# Patient Record
Sex: MALE | Race: WHITE | NOT HISPANIC OR LATINO | Employment: OTHER | URBAN - METROPOLITAN AREA
[De-identification: names, ages, dates, MRNs, and addresses within clinical notes are randomized per-mention and may not be internally consistent; named-entity substitution may affect disease eponyms.]

---

## 2021-03-19 ENCOUNTER — RECORDS - HEALTHEAST (OUTPATIENT)
Dept: LAB | Facility: CLINIC | Age: 46
End: 2021-03-19

## 2021-03-19 LAB
SARS-COV-2 PCR COMMENT: NORMAL
SARS-COV-2 RNA SPEC QL NAA+PROBE: NEGATIVE
SARS-COV-2 VIRUS SPECIMEN SOURCE: NORMAL

## 2023-10-27 ENCOUNTER — APPOINTMENT (OUTPATIENT)
Dept: GENERAL RADIOLOGY | Facility: CLINIC | Age: 48
End: 2023-10-27
Attending: EMERGENCY MEDICINE
Payer: MEDICARE

## 2023-10-27 ENCOUNTER — HOSPITAL ENCOUNTER (EMERGENCY)
Facility: CLINIC | Age: 48
Discharge: HOME | End: 2023-10-27
Attending: EMERGENCY MEDICINE | Admitting: EMERGENCY MEDICINE
Payer: MEDICARE

## 2023-10-27 ENCOUNTER — APPOINTMENT (OUTPATIENT)
Dept: CT IMAGING | Facility: CLINIC | Age: 48
End: 2023-10-27
Attending: EMERGENCY MEDICINE
Payer: MEDICARE

## 2023-10-27 VITALS
OXYGEN SATURATION: 96 % | DIASTOLIC BLOOD PRESSURE: 61 MMHG | SYSTOLIC BLOOD PRESSURE: 110 MMHG | HEART RATE: 90 BPM | RESPIRATION RATE: 20 BRPM | TEMPERATURE: 98 F

## 2023-10-27 DIAGNOSIS — R55 SYNCOPE, UNSPECIFIED SYNCOPE TYPE: ICD-10-CM

## 2023-10-27 LAB
ANION GAP SERPL CALCULATED.3IONS-SCNC: 12 MMOL/L (ref 7–15)
BASOPHILS # BLD AUTO: 0.1 10E3/UL (ref 0–0.2)
BASOPHILS NFR BLD AUTO: 1 %
BUN SERPL-MCNC: 16.6 MG/DL (ref 6–20)
CALCIUM SERPL-MCNC: 8.4 MG/DL (ref 8.6–10)
CHLORIDE SERPL-SCNC: 106 MMOL/L (ref 98–107)
CREAT SERPL-MCNC: 1.09 MG/DL (ref 0.67–1.17)
DEPRECATED HCO3 PLAS-SCNC: 21 MMOL/L (ref 22–29)
EGFRCR SERPLBLD CKD-EPI 2021: 83 ML/MIN/1.73M2
EOSINOPHIL # BLD AUTO: 0 10E3/UL (ref 0–0.7)
EOSINOPHIL NFR BLD AUTO: 0 %
ERYTHROCYTE [DISTWIDTH] IN BLOOD BY AUTOMATED COUNT: 13.2 % (ref 10–15)
GLUCOSE SERPL-MCNC: 102 MG/DL (ref 70–99)
HCT VFR BLD AUTO: 39.9 % (ref 40–53)
HGB BLD-MCNC: 14.1 G/DL (ref 13.3–17.7)
IMM GRANULOCYTES # BLD: 0 10E3/UL
IMM GRANULOCYTES NFR BLD: 1 %
LYMPHOCYTES # BLD AUTO: 0.5 10E3/UL (ref 0.8–5.3)
LYMPHOCYTES NFR BLD AUTO: 6 %
MCH RBC QN AUTO: 34.8 PG (ref 26.5–33)
MCHC RBC AUTO-ENTMCNC: 35.3 G/DL (ref 31.5–36.5)
MCV RBC AUTO: 99 FL (ref 78–100)
MONOCYTES # BLD AUTO: 0.7 10E3/UL (ref 0–1.3)
MONOCYTES NFR BLD AUTO: 9 %
NEUTROPHILS # BLD AUTO: 6.3 10E3/UL (ref 1.6–8.3)
NEUTROPHILS NFR BLD AUTO: 83 %
NRBC # BLD AUTO: 0 10E3/UL
NRBC BLD AUTO-RTO: 0 /100
PLATELET # BLD AUTO: 224 10E3/UL (ref 150–450)
POTASSIUM SERPL-SCNC: 4.4 MMOL/L (ref 3.4–5.3)
RBC # BLD AUTO: 4.05 10E6/UL (ref 4.4–5.9)
SODIUM SERPL-SCNC: 139 MMOL/L (ref 135–145)
WBC # BLD AUTO: 7.5 10E3/UL (ref 4–11)

## 2023-10-27 PROCEDURE — 82310 ASSAY OF CALCIUM: CPT | Performed by: EMERGENCY MEDICINE

## 2023-10-27 PROCEDURE — 99284 EMERGENCY DEPT VISIT MOD MDM: CPT | Mod: 25 | Performed by: EMERGENCY MEDICINE

## 2023-10-27 PROCEDURE — 99285 EMERGENCY DEPT VISIT HI MDM: CPT | Mod: 25 | Performed by: EMERGENCY MEDICINE

## 2023-10-27 PROCEDURE — 70450 CT HEAD/BRAIN W/O DYE: CPT | Mod: MG

## 2023-10-27 PROCEDURE — 85014 HEMATOCRIT: CPT | Performed by: EMERGENCY MEDICINE

## 2023-10-27 PROCEDURE — 71046 X-RAY EXAM CHEST 2 VIEWS: CPT

## 2023-10-27 PROCEDURE — 93010 ELECTROCARDIOGRAM REPORT: CPT | Performed by: EMERGENCY MEDICINE

## 2023-10-27 PROCEDURE — 93005 ELECTROCARDIOGRAM TRACING: CPT | Performed by: EMERGENCY MEDICINE

## 2023-10-27 PROCEDURE — 36415 COLL VENOUS BLD VENIPUNCTURE: CPT | Performed by: EMERGENCY MEDICINE

## 2023-10-27 ASSESSMENT — ACTIVITIES OF DAILY LIVING (ADL): ADLS_ACUITY_SCORE: 35

## 2023-10-27 NOTE — ED PROVIDER NOTES
History     Chief Complaint   Patient presents with    Loss of Consciousness     HPI  Levar Swan is a 48 year old male who presents with loss of consciousness.  The patient has a history of Down syndrome.  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 Felt lightheaded. Set down ice. Then lost consciousness. Denies hitting head. No shaking after. Woke up right away.     No cardiac history. No seizure history.     Allergies:  Allergies   Allergen Reactions    Penicillins Unknown       Problem List:    There are no problems to display for this patient.       Past Medical History:    No past medical history on file.    Past Surgical History:    No past surgical history on file.    Family History:    No family history on file.    Social History:  Marital Status:  Single [1]  Social History     Tobacco Use    Smoking status: Never   Substance Use Topics    Alcohol use: No    Drug use: No        Medications:    No current outpatient medications on file.        Review of Systems    Physical Exam   BP: 106/73  Pulse: 96  Temp: 98  F (36.7  C)  Resp: 13  SpO2: 95 %      Physical Exam  Constitutional:       General: He is not in acute distress.     Appearance: He is not toxic-appearing.   Cardiovascular:      Rate and Rhythm: Normal rate.      Heart sounds: No murmur heard.     Comments: No m/r/g  Pulmonary:      Effort: No respiratory distress.      Breath sounds: No wheezing.   Abdominal:      General: There is no distension.      Tenderness: There is no abdominal tenderness.   Neurological:      Mental Status: He is alert.      Comments: No facial droop  Strength is 5/5 in upper and lower extremity b/l  Reflexes normal throughout         ED Course              ED Course as of 11/06/23 0202   Fri Oct 27, 2023   1255 ECG w/o monet, std, acute tws, no brugada pattern, no hocm pattern, normal intervals, no blocks, no epsilon wave or delta wave.      1255 No wbc elevation. Not anemic.   1342 CT head  was read as negative.   1343 I independently interpreted the head CT and do not see any acute processes.   1343 I independently interpreted the chest x-ray and I did not see any acute processes.   1343 Chest x-ray was read as negative and I concur with this read.   1343 Electrolytes are generally reassuring.  Calcium is slightly low at 8.4.   1413 Spoke with the patient and his caregiver who is at bedside.  He ate lunch here.  He feels very well.  He would like to go home.  I gave him ER precautions.  Told him not totally sure causes but that his work appears reassuring.  I am going to discharge him at his request at this time.   1414 Told him that my best guess was that there was some component of stress and also some Valsalva when he was lifting a box that reduced venous return and reduced blood flow temporarily to his brain.  Is also discussed in the room that he apparently has very bad sleep apnea that he refuses to   1414  use a mouthguard before and refuses to use a machine for.  He apparently sleeps very poorly and also gets up very early for work.  This may have also played a role.  I encouraged him to drink more milk for his low calcium.     Procedures              EKG Interpretation:      Interpreted by Steve Jones MD  Time reviewed: 1255  Symptoms at time of EKG: none   Rhythm: normal sinus   Rate: normal  Axis: normal  Ectopy: none  Conduction: normal  ST Segments/ T Waves: No ST-T wave changes  Q Waves: none  Comparison to prior: No old EKG available    Clinical Impression: normal EKG        No results found for this or any previous visit (from the past 24 hour(s)).      Medications - No data to display    Assessments & Plan (with Medical Decision Making)     Appears to be syncope, somewhat reassuring history given lifting and then lightheadedness. Will get ECG, head CT out of cautions, labs. Will reassess. Exam reassuring.     I have reviewed the nursing notes.    I have reviewed the findings,  diagnosis, plan and need for follow up with the patient.        Medical Decision Making  The patient's presentation was of high complexity (an acute health issue posing potential threat to life or bodily function).    The patient's evaluation involved:  an assessment requiring an independent historian (see separate area of note for details)  ordering and/or review of 3+ test(s) in this encounter (see separate area of note for details)  independent interpretation of testing performed by another health professional (see separate area of note for details)    The patient's management necessitated high risk (a decision regarding hospitalization).        There are no discharge medications for this patient.      Final diagnoses:   Syncope, unspecified syncope type       10/27/2023   [facility] EMERGENCY DEPT       Steve Jones MD  11/06/23 0204

## 2023-10-27 NOTE — ED NOTES
Discharge instructions reviewed in detail.  Pt care giver verbalized understanding.  No further questions or concerns.

## 2023-10-27 NOTE — ED TRIAGE NOTES
Pt was cleaning when he had a syncopal episode.  Was assisted to a chair where he passed out again.  SBP initially in 80's for EMS.  20g IV placed into left hand.  500 ml normal saline infused.  Pt with hx of down syndrome, alert and oriented to self, location and why he's here. Pt states he feels dizzy.  Lives in group home.     Triage Assessment (Adult)       Row Name 10/27/23 1139          Triage Assessment    Airway WDL WDL        Respiratory WDL    Respiratory WDL WDL        Skin Circulation/Temperature WDL    Skin Circulation/Temperature WDL WDL        Cardiac WDL    Cardiac WDL WDL        Peripheral/Neurovascular WDL    Peripheral Neurovascular WDL WDL        Cognitive/Neuro/Behavioral WDL    Cognitive/Neuro/Behavioral WDL WDL;X  hx of down's syndrome     Orientation oriented x 4

## 2023-10-27 NOTE — DISCHARGE INSTRUCTIONS
You were seen today for passing out.  We call this syncope.  I am not totally sure what caused it, but things here look okay and I think it is probably okay for you to go home.  If you feel worse or this happens again, please come back.  I want you to follow-up with your regular doctor within the next 5 days.  I would also encourage you to use your mouthguard at night.  I think this will help you rest better and you will feel better at work and hopefully prevent this from happening again.    Your calcium was slightly low, so please drink more milk.  I do not think this caused you to pass out, however.    Take good care.     It was nice to meet you.

## 2023-11-17 ENCOUNTER — LAB REQUISITION (OUTPATIENT)
Dept: LAB | Facility: CLINIC | Age: 48
End: 2023-11-17
Payer: MEDICARE

## 2023-11-17 DIAGNOSIS — R41.3 OTHER AMNESIA: ICD-10-CM

## 2023-11-17 PROCEDURE — 82607 VITAMIN B-12: CPT | Mod: ORL | Performed by: FAMILY MEDICINE

## 2023-11-17 PROCEDURE — 84443 ASSAY THYROID STIM HORMONE: CPT | Mod: ORL | Performed by: FAMILY MEDICINE

## 2023-11-18 LAB
TSH SERPL DL<=0.005 MIU/L-ACNC: 2.51 UIU/ML (ref 0.3–4.2)
VIT B12 SERPL-MCNC: 331 PG/ML (ref 232–1245)

## 2024-04-19 ENCOUNTER — LAB REQUISITION (OUTPATIENT)
Dept: LAB | Facility: CLINIC | Age: 49
End: 2024-04-19
Payer: MEDICARE

## 2024-04-19 DIAGNOSIS — Z13.220 ENCOUNTER FOR SCREENING FOR LIPOID DISORDERS: ICD-10-CM

## 2024-04-19 DIAGNOSIS — Z13.228 ENCOUNTER FOR SCREENING FOR OTHER METABOLIC DISORDERS: ICD-10-CM

## 2024-04-19 PROCEDURE — 80053 COMPREHEN METABOLIC PANEL: CPT | Mod: ORL | Performed by: FAMILY MEDICINE

## 2024-04-19 PROCEDURE — 80061 LIPID PANEL: CPT | Mod: ORL | Performed by: FAMILY MEDICINE

## 2024-04-19 PROCEDURE — 82607 VITAMIN B-12: CPT | Mod: ORL | Performed by: FAMILY MEDICINE

## 2024-04-20 LAB
ALBUMIN SERPL BCG-MCNC: 3.9 G/DL (ref 3.5–5.2)
ALP SERPL-CCNC: 128 U/L (ref 40–150)
ALT SERPL W P-5'-P-CCNC: 20 U/L (ref 0–70)
ANION GAP SERPL CALCULATED.3IONS-SCNC: 11 MMOL/L (ref 7–15)
AST SERPL W P-5'-P-CCNC: 23 U/L (ref 0–45)
BILIRUB SERPL-MCNC: 0.3 MG/DL
BUN SERPL-MCNC: 15.4 MG/DL (ref 6–20)
CALCIUM SERPL-MCNC: 8.6 MG/DL (ref 8.6–10)
CHLORIDE SERPL-SCNC: 104 MMOL/L (ref 98–107)
CHOLEST SERPL-MCNC: 180 MG/DL
CREAT SERPL-MCNC: 0.96 MG/DL (ref 0.67–1.17)
DEPRECATED HCO3 PLAS-SCNC: 25 MMOL/L (ref 22–29)
EGFRCR SERPLBLD CKD-EPI 2021: >90 ML/MIN/1.73M2
FASTING STATUS PATIENT QL REPORTED: ABNORMAL
GLUCOSE SERPL-MCNC: 88 MG/DL (ref 70–99)
HDLC SERPL-MCNC: 31 MG/DL
LDLC SERPL CALC-MCNC: 110 MG/DL
NONHDLC SERPL-MCNC: 149 MG/DL
POTASSIUM SERPL-SCNC: 3.9 MMOL/L (ref 3.4–5.3)
PROT SERPL-MCNC: 7.3 G/DL (ref 6.4–8.3)
SODIUM SERPL-SCNC: 140 MMOL/L (ref 135–145)
TRIGL SERPL-MCNC: 197 MG/DL

## 2024-04-22 ENCOUNTER — LAB REQUISITION (OUTPATIENT)
Dept: LAB | Facility: CLINIC | Age: 49
End: 2024-04-22
Payer: MEDICARE

## 2024-04-22 DIAGNOSIS — Z13.228 ENCOUNTER FOR SCREENING FOR OTHER METABOLIC DISORDERS: ICD-10-CM

## 2024-04-22 LAB — VIT B12 SERPL-MCNC: 330 PG/ML (ref 232–1245)

## 2024-08-26 ENCOUNTER — APPOINTMENT (OUTPATIENT)
Dept: RADIOLOGY | Facility: HOSPITAL | Age: 49
End: 2024-08-26
Attending: EMERGENCY MEDICINE
Payer: MEDICARE

## 2024-08-26 ENCOUNTER — APPOINTMENT (OUTPATIENT)
Dept: CT IMAGING | Facility: HOSPITAL | Age: 49
End: 2024-08-26
Attending: EMERGENCY MEDICINE
Payer: MEDICARE

## 2024-08-26 ENCOUNTER — HOSPITAL ENCOUNTER (EMERGENCY)
Facility: HOSPITAL | Age: 49
Discharge: HOME | End: 2024-08-26
Attending: EMERGENCY MEDICINE | Admitting: EMERGENCY MEDICINE
Payer: MEDICARE

## 2024-08-26 VITALS
HEIGHT: 66 IN | BODY MASS INDEX: 30.91 KG/M2 | TEMPERATURE: 98.5 F | OXYGEN SATURATION: 96 % | HEART RATE: 102 BPM | DIASTOLIC BLOOD PRESSURE: 54 MMHG | SYSTOLIC BLOOD PRESSURE: 97 MMHG | WEIGHT: 192.3 LBS | RESPIRATION RATE: 25 BRPM

## 2024-08-26 DIAGNOSIS — D72.829 LEUKOCYTOSIS, UNSPECIFIED TYPE: ICD-10-CM

## 2024-08-26 DIAGNOSIS — R55 SYNCOPE, UNSPECIFIED SYNCOPE TYPE: ICD-10-CM

## 2024-08-26 LAB
ALBUMIN UR-MCNC: NEGATIVE MG/DL
ANION GAP SERPL CALCULATED.3IONS-SCNC: 8 MMOL/L (ref 7–15)
APPEARANCE UR: CLEAR
BASOPHILS # BLD AUTO: 0.1 10E3/UL (ref 0–0.2)
BASOPHILS NFR BLD AUTO: 0 %
BILIRUB UR QL STRIP: NEGATIVE
BUN SERPL-MCNC: 16.6 MG/DL (ref 6–20)
CALCIUM SERPL-MCNC: 8.4 MG/DL (ref 8.8–10.4)
CHLORIDE SERPL-SCNC: 101 MMOL/L (ref 98–107)
COLOR UR AUTO: COLORLESS
CREAT SERPL-MCNC: 1.17 MG/DL (ref 0.67–1.17)
D DIMER PPP FEU-MCNC: 0.4 UG/ML FEU (ref 0–0.5)
EGFRCR SERPLBLD CKD-EPI 2021: 76 ML/MIN/1.73M2
EOSINOPHIL # BLD AUTO: 0 10E3/UL (ref 0–0.7)
EOSINOPHIL NFR BLD AUTO: 0 %
ERYTHROCYTE [DISTWIDTH] IN BLOOD BY AUTOMATED COUNT: 13.7 % (ref 10–15)
GLUCOSE SERPL-MCNC: 114 MG/DL (ref 70–99)
GLUCOSE UR STRIP-MCNC: NEGATIVE MG/DL
HCO3 SERPL-SCNC: 28 MMOL/L (ref 22–29)
HCT VFR BLD AUTO: 40.7 % (ref 40–53)
HGB BLD-MCNC: 14.1 G/DL (ref 13.3–17.7)
HGB UR QL STRIP: NEGATIVE
IMM GRANULOCYTES # BLD: 0.1 10E3/UL
IMM GRANULOCYTES NFR BLD: 1 %
KETONES UR STRIP-MCNC: NEGATIVE MG/DL
LACTATE SERPL-SCNC: 1.1 MMOL/L (ref 0.7–2)
LEUKOCYTE ESTERASE UR QL STRIP: NEGATIVE
LYMPHOCYTES # BLD AUTO: 0.5 10E3/UL (ref 0.8–5.3)
LYMPHOCYTES NFR BLD AUTO: 3 %
MAGNESIUM SERPL-MCNC: 1.9 MG/DL (ref 1.7–2.3)
MCH RBC QN AUTO: 34 PG (ref 26.5–33)
MCHC RBC AUTO-ENTMCNC: 34.6 G/DL (ref 31.5–36.5)
MCV RBC AUTO: 98 FL (ref 78–100)
MONOCYTES # BLD AUTO: 0.7 10E3/UL (ref 0–1.3)
MONOCYTES NFR BLD AUTO: 3 %
MUCOUS THREADS #/AREA URNS LPF: PRESENT /LPF
NEUTROPHILS # BLD AUTO: 20.1 10E3/UL (ref 1.6–8.3)
NEUTROPHILS NFR BLD AUTO: 93 %
NITRATE UR QL: NEGATIVE
NRBC # BLD AUTO: 0 10E3/UL
NRBC BLD AUTO-RTO: 0 /100
NT-PROBNP SERPL-MCNC: <36 PG/ML (ref 0–900)
PH UR STRIP: 6.5 [PH] (ref 5–7)
PLATELET # BLD AUTO: 262 10E3/UL (ref 150–450)
POTASSIUM SERPL-SCNC: 4.2 MMOL/L (ref 3.4–5.3)
RBC # BLD AUTO: 4.15 10E6/UL (ref 4.4–5.9)
RBC URINE: <1 /HPF
SODIUM SERPL-SCNC: 137 MMOL/L (ref 135–145)
SP GR UR STRIP: 1 (ref 1–1.03)
TROPONIN T SERPL HS-MCNC: 10 NG/L
TROPONIN T SERPL HS-MCNC: 10 NG/L
UROBILINOGEN UR STRIP-MCNC: <2 MG/DL
WBC # BLD AUTO: 21.5 10E3/UL (ref 4–11)
WBC URINE: <1 /HPF

## 2024-08-26 PROCEDURE — 99285 EMERGENCY DEPT VISIT HI MDM: CPT | Mod: 25

## 2024-08-26 PROCEDURE — 85379 FIBRIN DEGRADATION QUANT: CPT | Performed by: EMERGENCY MEDICINE

## 2024-08-26 PROCEDURE — 80048 BASIC METABOLIC PNL TOTAL CA: CPT | Performed by: STUDENT IN AN ORGANIZED HEALTH CARE EDUCATION/TRAINING PROGRAM

## 2024-08-26 PROCEDURE — 93005 ELECTROCARDIOGRAM TRACING: CPT | Performed by: STUDENT IN AN ORGANIZED HEALTH CARE EDUCATION/TRAINING PROGRAM

## 2024-08-26 PROCEDURE — 96360 HYDRATION IV INFUSION INIT: CPT

## 2024-08-26 PROCEDURE — 71046 X-RAY EXAM CHEST 2 VIEWS: CPT

## 2024-08-26 PROCEDURE — 258N000003 HC RX IP 258 OP 636: Performed by: EMERGENCY MEDICINE

## 2024-08-26 PROCEDURE — 84484 ASSAY OF TROPONIN QUANT: CPT | Performed by: EMERGENCY MEDICINE

## 2024-08-26 PROCEDURE — 81003 URINALYSIS AUTO W/O SCOPE: CPT | Performed by: EMERGENCY MEDICINE

## 2024-08-26 PROCEDURE — 83880 ASSAY OF NATRIURETIC PEPTIDE: CPT | Performed by: EMERGENCY MEDICINE

## 2024-08-26 PROCEDURE — 84484 ASSAY OF TROPONIN QUANT: CPT | Performed by: STUDENT IN AN ORGANIZED HEALTH CARE EDUCATION/TRAINING PROGRAM

## 2024-08-26 PROCEDURE — 93005 ELECTROCARDIOGRAM TRACING: CPT | Performed by: EMERGENCY MEDICINE

## 2024-08-26 PROCEDURE — 36415 COLL VENOUS BLD VENIPUNCTURE: CPT | Performed by: EMERGENCY MEDICINE

## 2024-08-26 PROCEDURE — 83735 ASSAY OF MAGNESIUM: CPT | Performed by: STUDENT IN AN ORGANIZED HEALTH CARE EDUCATION/TRAINING PROGRAM

## 2024-08-26 PROCEDURE — 83605 ASSAY OF LACTIC ACID: CPT | Performed by: EMERGENCY MEDICINE

## 2024-08-26 PROCEDURE — 85025 COMPLETE CBC W/AUTO DIFF WBC: CPT | Performed by: STUDENT IN AN ORGANIZED HEALTH CARE EDUCATION/TRAINING PROGRAM

## 2024-08-26 PROCEDURE — 70450 CT HEAD/BRAIN W/O DYE: CPT | Mod: MG

## 2024-08-26 RX ADMIN — SODIUM CHLORIDE 1000 ML: 9 INJECTION, SOLUTION INTRAVENOUS at 14:03

## 2024-08-26 ASSESSMENT — COLUMBIA-SUICIDE SEVERITY RATING SCALE - C-SSRS
2. HAVE YOU ACTUALLY HAD ANY THOUGHTS OF KILLING YOURSELF IN THE PAST MONTH?: NO
6. HAVE YOU EVER DONE ANYTHING, STARTED TO DO ANYTHING, OR PREPARED TO DO ANYTHING TO END YOUR LIFE?: NO
1. IN THE PAST MONTH, HAVE YOU WISHED YOU WERE DEAD OR WISHED YOU COULD GO TO SLEEP AND NOT WAKE UP?: NO

## 2024-08-26 ASSESSMENT — ACTIVITIES OF DAILY LIVING (ADL)
ADLS_ACUITY_SCORE: 35
ADLS_ACUITY_SCORE: 33
ADLS_ACUITY_SCORE: 35

## 2024-08-26 ASSESSMENT — ENCOUNTER SYMPTOMS
BLOOD IN STOOL: 0
HEADACHES: 0
ABDOMINAL PAIN: 0

## 2024-08-26 NOTE — ED TRIAGE NOTES
Patient was sitting at [employer], had episode of LOC.  Pt appeared to have complete body shaking, no loss bowel/bladder control, awoke- the A&O.  Pt voices no c/o.  Pt has not seizure hx.   Has had one episode like this in past.

## 2024-08-26 NOTE — ED PROVIDER NOTES
EMERGENCY DEPARTMENT ENCOUNTER      NAME: Levar Swan  AGE: 49 year old male  YOB: 1975  MRN: [id number]  EVALUATION DATE & TIME: 8/26/2024 12:51 PM    PCP: Josi Ho    ED PROVIDER: Bairon Hinkle MD      Chief Complaint   Patient presents with    Loss of Consciousness         FINAL IMPRESSION:  1. Syncope, unspecified syncope type    2. Leukocytosis, unspecified type          ED COURSE & MEDICAL DECISION MAKING:    Pertinent Labs & Imaging studies reviewed. (See chart for details)  49 year old male presents to the Emergency Department for evaluation of loss of consciousness.      ED Course as of 08/26/24 1550   Mon Aug 26, 2024   1336 Here with syncope   1336 DDX seizure, subarachnoid hemorrhage, ruptured abdominal aortic aneurysm, aortic dissection, perforated gastric/duodenal ulcer, ruptured ectopic pregnancy, stroke/TIA, acute coronary syndrome, pulmonary embolism, arrhythmia (atrioventricular block/symptomatic bradycardia, Shantell-Parkinson-White syndrome, Brugada syndrome, hypertrophic cardiomyopathy, long QT syndrome, arrhythmogenic right ventricular dysplasia), aortic stenosis, hypoglycemia, vasovagal, or orthostatic hypotension.     1337 WBC(!): 21.5  Denies fever   1337 Will obtain UA and chest imaging.  No abdominal tenderness   1402 I spoke with his mother on the phone. He was seen by [place] [facility] 10/23 for syncope. He has fainted before per mother   1530 Troponin T, High Sensitivity: 10   1548 UA does not show UTI.  Patient signed to Dr. Kumar pending completion of CT head and chest x-ray.  If no evidence of pneumonia or hemorrhage plan for discharge home and follow-up with primary care doc       Medical Decision Making  Obtained supplemental history:Supplemental history obtained?: Documented in chart and Other: Supervisor  Reviewed external records: External records reviewed?: No  Care impacted by chronic illness:N/A  Care significantly affected by social determinants of  health:N/A  Did you consider but not order tests?: Work up considered but not performed and documented in chart, if applicable  Did you interpret images independently?: Independent interpretation of ECG and images noted in documentation, when applicable.  Consultation discussion with other provider:Did you involve another provider (consultant, , pharmacy, etc.)?: I discussed the care with another health care provider, see documentation for details.  Admission considered. Patient was signed out to the oncoming physician, disposition pending.  CT Pulmonary Angiogram for PE or Dissection:The patient was low risk for pulmonary embolism and had a normal d-dimer, this a CT PE study was NOT ordered.        @eccmips@    At the conclusion of the encounter I discussed the results of all of the tests and the disposition. The questions were answered. The patient or family acknowledged understanding and was agreeable with the care plan.       MEDICATIONS GIVEN IN THE EMERGENCY:  Medications   sodium chloride 0.9% BOLUS 1,000 mL (0 mLs Intravenous Stopped 8/26/24 1450)       NEW PRESCRIPTIONS STARTED AT TODAY'S ER VISIT  New Prescriptions    No medications on file          =================================================================    HPI    Patient information was obtained from: patient    Use of : N/A       Levar Swan is a 49 year old male with no pertinent history who presents to this ED by private car for evaluation of loss of consciousness.    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 Patient then shook for 15 seconds. She mentioned that his mother mentioned that this occurred previously.    Patient denies headache, chest pain, abdominal pain, recent injuries, recent head trauma, past heart murmurs, melena, and hematochezia. Patient felt fine yesterday.    REVIEW OF SYSTEMS   Review of Systems   Cardiovascular:  Negative for chest pain.  "  Gastrointestinal:  Negative for abdominal pain and blood in stool.   Neurological:  Negative for headaches.       PAST MEDICAL HISTORY:  No past medical history on file.    PAST SURGICAL HISTORY:  No past surgical history on file.        CURRENT MEDICATIONS:    No current outpatient medications on file.      ALLERGIES:  Allergies   Allergen Reactions    Penicillins Unknown       FAMILY HISTORY:  No family history on file.    SOCIAL HISTORY:   Social History     Socioeconomic History    Marital status: Single   Tobacco Use    Smoking status: Never   Substance and Sexual Activity    Alcohol use: No    Drug use: No       VITALS:  BP (!) 149/87   Pulse 94   Temp 98.5  F (36.9  C) (Temporal)   Resp 22   Ht 1.664 m (5' 5.5\")   Wt 87.2 kg (192 lb 4.8 oz)   SpO2 95%   BMI 31.51 kg/m      PHYSICAL EXAM      Vitals: BP (!) 149/87   Pulse 94   Temp 98.5  F (36.9  C) (Temporal)   Resp 22   Ht 1.664 m (5' 5.5\")   Wt 87.2 kg (192 lb 4.8 oz)   SpO2 95%   BMI 31.51 kg/m    General: Appears in no acute distress, awake, alert, interactive.  Eyes: Conjunctivae non-injected. Sclera anicteric.  HENT: Atraumatic.  Neck: Supple.  Respiratory/Chest: Respiration unlabored.no wheezing. No rhonchi.   Heart; no murmurs  Abdomen: non distended, no abdominal tenderness  Musculoskeletal: Normal extremities. No edema or erythema.  Skin: Normal color. No rash or diaphoresis.  Neurologic: Face symmetric, moves all extremities spontaneously. Speech clear. GCS 15. 5/5 strength arms and legs  Psychiatric: Oriented to person, place, and time. Affect appropriate.    LAB:  All pertinent labs reviewed and interpreted.  Results for orders placed or performed during the hospital encounter of 08/26/24   Basic metabolic panel   Result Value Ref Range    Sodium 137 135 - 145 mmol/L    Potassium 4.2 3.4 - 5.3 mmol/L    Chloride 101 98 - 107 mmol/L    Carbon Dioxide (CO2) 28 22 - 29 mmol/L    Anion Gap 8 7 - 15 mmol/L    Urea Nitrogen 16.6 6.0 - " 20.0 mg/dL    Creatinine 1.17 0.67 - 1.17 mg/dL    GFR Estimate 76 >60 mL/min/1.73m2    Calcium 8.4 (L) 8.8 - 10.4 mg/dL    Glucose 114 (H) 70 - 99 mg/dL   Result Value Ref Range    Magnesium 1.9 1.7 - 2.3 mg/dL   Result Value Ref Range    Troponin T, High Sensitivity 10 <=22 ng/L   CBC with platelets and differential   Result Value Ref Range    WBC Count 21.5 (H) 4.0 - 11.0 10e3/uL    RBC Count 4.15 (L) 4.40 - 5.90 10e6/uL    Hemoglobin 14.1 13.3 - 17.7 g/dL    Hematocrit 40.7 40.0 - 53.0 %    MCV 98 78 - 100 fL    MCH 34.0 (H) 26.5 - 33.0 pg    MCHC 34.6 31.5 - 36.5 g/dL    RDW 13.7 10.0 - 15.0 %    Platelet Count 262 150 - 450 10e3/uL    % Neutrophils 93 %    % Lymphocytes 3 %    % Monocytes 3 %    % Eosinophils 0 %    % Basophils 0 %    % Immature Granulocytes 1 %    NRBCs per 100 WBC 0 <1 /100    Absolute Neutrophils 20.1 (H) 1.6 - 8.3 10e3/uL    Absolute Lymphocytes 0.5 (L) 0.8 - 5.3 10e3/uL    Absolute Monocytes 0.7 0.0 - 1.3 10e3/uL    Absolute Eosinophils 0.0 0.0 - 0.7 10e3/uL    Absolute Basophils 0.1 0.0 - 0.2 10e3/uL    Absolute Immature Granulocytes 0.1 <=0.4 10e3/uL    Absolute NRBCs 0.0 10e3/uL   D dimer quantitative   Result Value Ref Range    D-Dimer Quantitative 0.40 0.00 - 0.50 ug/mL FEU   Lactic acid whole blood   Result Value Ref Range    Lactic Acid 1.1 0.7 - 2.0 mmol/L   UA with Microscopic reflex to Culture    Specimen: Urine, Midstream   Result Value Ref Range    Color Urine Colorless Colorless, Straw, Light Yellow, Yellow    Appearance Urine Clear Clear    Glucose Urine Negative Negative mg/dL    Bilirubin Urine Negative Negative    Ketones Urine Negative Negative mg/dL    Specific Gravity Urine 1.004 1.001 - 1.030    Blood Urine Negative Negative    pH Urine 6.5 5.0 - 7.0    Protein Albumin Urine Negative Negative mg/dL    Urobilinogen Urine <2.0 <2.0 mg/dL    Nitrite Urine Negative Negative    Leukocyte Esterase Urine Negative Negative    Mucus Urine Present (A) None Seen /LPF    RBC  Urine <1 <=2 /HPF    WBC Urine <1 <=5 /HPF   Troponin T, High Sensitivity (now)   Result Value Ref Range    Troponin T, High Sensitivity 10 <=22 ng/L       RADIOLOGY:  Reviewed all pertinent imaging. Please see official radiology report.  Chest XR,  PA & LAT    (Results Pending)   Head CT w/o contrast    (Results Pending)       EKG:    Performed at: 12:43:07    Impression: Normal ECG    Rate: 90 bpm  Rhythm: Sinus  Axis: n/a  VA Interval: 130 ms  QRS Interval: 86 ms  QTc Interval: 428 ms  ST Changes: n/a  Comparison: When compared with ECG of 02/08/2012 at 15:06, no significant change was found.    I have independently reviewed and interpreted the EKG(s) documented above.    PROCEDURES:           I, Amanda Oates, am serving as a scribe to document services personally performed by Bairon Hinkle MD based on my observation and the provider's statements to me. I, Bairon Hinkle MD, attest that Amanda Oates is acting in a scribe capacity, has observed my performance of the services and has documented them in accordance with my direction.    Bairon Hinkle MD  [facility] EMERGENCY DEPARTMENT  [facility] [facility] [address]  [phone]        Bairon Hinkle MD  08/26/24 8690

## 2024-08-26 NOTE — ED PROVIDER NOTES
EMERGENCY DEPARTMENT SIGN OUT NOTE        ED COURSE AND MEDICAL DECISION MAKING  Patient was signed out to me by Dr Mesfin Hinkle at 4:05 PM    In brief, Levar Swan is a 49 year old male who initially presented likely syncopal episode.    At time of sign out, disposition was pending CT head and chest x-ray.   4:29 PM.  Chest x-ray with evidence of cephalization of vessels suggesting pulmonary edema.  5:29 PM.  CT images reviewed.  Patient with unusual calcifications of the anterior horns.  No acute hemorrhage.  Awaiting definitive report.  PE returns normal at 36.  6:04 PM.  Radiology reports CT unchanged.  Will proceed with discharge as planned.      FINAL IMPRESSION    1. Syncope, unspecified syncope type    2. Leukocytosis, unspecified type        ED MEDS  Medications   sodium chloride 0.9% BOLUS 1,000 mL (0 mLs Intravenous Stopped 8/26/24 1450)       LAB  Results for orders placed or performed during the hospital encounter of 08/26/24   Chest XR,  PA & LAT     Status: None    Narrative    EXAM: XR CHEST 2 VIEWS  LOCATION: [facility]  DATE: 8/26/2024    INDICATION: syncope  COMPARISON: None.      Impression    IMPRESSION: Heart size appears normal. Cephalization of blood flow to suggest pulmonary vascular congestion. Benign no pleural effusion. Mild patchy lung base opacities may represent prominent pulmonary vessels, and/or asymmetric edema.   Head CT w/o contrast     Status: None    Narrative    EXAM: CT HEAD W/O CONTRAST  LOCATION: [facility]  DATE: 8/26/2024    INDICATION: syncope  COMPARISON: 10/27/2023 head CT.  TECHNIQUE: Routine CT Head without IV contrast. Multiplanar reformats. Dose reduction techniques were used.    FINDINGS:  INTRACRANIAL CONTENTS: No intracranial hemorrhage, extraaxial collection, or mass effect.  No CT evidence of acute infarct. Redemonstrated parenchymal calcification involving the globus pallidus bilaterally as well as the  periventricular white matter   adjacent to the frontal horns bilaterally, more so on the right. Additional tiny focus of calcification in the subcortical right frontoparietal white matter and in the medial right cerebellar hemisphere. These are unchanged. Normal ventricles and sulci.     VISUALIZED ORBITS/SINUSES/MASTOIDS: No intraorbital abnormality. No paranasal sinus mucosal disease. No middle ear or mastoid effusion.    BONES/SOFT TISSUES: No skull fracture. No scalp hematoma.      Impression    IMPRESSION:  1.  Unchanged exam with no acute intracranial abnormality.    2.  Redemonstrated parenchymal calcification as described above. As noted previously, this may simply reflect dystrophic calcification. While more extensive basal ganglia calcification would typically be expected in the setting of fahr syndrome, this   should nonetheless be considered and correlation with abnormalities of calcium metabolism is advised.   Basic metabolic panel     Status: Abnormal   Result Value Ref Range    Sodium 137 135 - 145 mmol/L    Potassium 4.2 3.4 - 5.3 mmol/L    Chloride 101 98 - 107 mmol/L    Carbon Dioxide (CO2) 28 22 - 29 mmol/L    Anion Gap 8 7 - 15 mmol/L    Urea Nitrogen 16.6 6.0 - 20.0 mg/dL    Creatinine 1.17 0.67 - 1.17 mg/dL    GFR Estimate 76 >60 mL/min/1.73m2    Calcium 8.4 (L) 8.8 - 10.4 mg/dL    Glucose 114 (H) 70 - 99 mg/dL   Magnesium     Status: Normal   Result Value Ref Range    Magnesium 1.9 1.7 - 2.3 mg/dL   Troponin T, High Sensitivity     Status: Normal   Result Value Ref Range    Troponin T, High Sensitivity 10 <=22 ng/L   CBC with platelets and differential     Status: Abnormal   Result Value Ref Range    WBC Count 21.5 (H) 4.0 - 11.0 10e3/uL    RBC Count 4.15 (L) 4.40 - 5.90 10e6/uL    Hemoglobin 14.1 13.3 - 17.7 g/dL    Hematocrit 40.7 40.0 - 53.0 %    MCV 98 78 - 100 fL    MCH 34.0 (H) 26.5 - 33.0 pg    MCHC 34.6 31.5 - 36.5 g/dL    RDW 13.7 10.0 - 15.0 %    Platelet Count 262 150 - 450 10e3/uL     % Neutrophils 93 %    % Lymphocytes 3 %    % Monocytes 3 %    % Eosinophils 0 %    % Basophils 0 %    % Immature Granulocytes 1 %    NRBCs per 100 WBC 0 <1 /100    Absolute Neutrophils 20.1 (H) 1.6 - 8.3 10e3/uL    Absolute Lymphocytes 0.5 (L) 0.8 - 5.3 10e3/uL    Absolute Monocytes 0.7 0.0 - 1.3 10e3/uL    Absolute Eosinophils 0.0 0.0 - 0.7 10e3/uL    Absolute Basophils 0.1 0.0 - 0.2 10e3/uL    Absolute Immature Granulocytes 0.1 <=0.4 10e3/uL    Absolute NRBCs 0.0 10e3/uL   D dimer quantitative     Status: Normal   Result Value Ref Range    D-Dimer Quantitative 0.40 0.00 - 0.50 ug/mL FEU    Narrative    This D-dimer assay is intended for use in conjunction with a clinical pretest probability assessment model to exclude pulmonary embolism (PE) and deep venous thrombosis (DVT) in outpatients suspected of PE or DVT. The cut-off value is 0.50 ug/mL FEU.    For patients 50 years of age or older, the application of age-adjusted cut-off values for D-Dimer may increase the specificity without significant effect on sensitivity. The literature suggested calculation age adjusted cut-off in ug/L = age in years x 10 ug/L. The results in this laboratory are reported as ug/mL rather than ug/L. The calculation for age adjusted cut off in ug/mL= age in years x 0.01 ug/mL. For example, the cut off for a 76 year old male is 75 x 0.01 ug/mL = 0.76 ug/mL (760 ug/L).    M Laura et al. Age adjusted D-dimer cut-off levels to rule out pulmonary embolism: The ADJUST-PE Study. MARILEE 2014;311:6618-3120.; HJ Angel et al. Diagnostic accuracy of conventional or age adjusted D-dimer cutoff values in older patients with suspected venous thromboembolism. Systemic review and meta-analysis. BMJ 2013:346:f2492.   Lactic acid whole blood     Status: Normal   Result Value Ref Range    Lactic Acid 1.1 0.7 - 2.0 mmol/L   UA with Microscopic reflex to Culture     Status: Abnormal    Specimen: Urine, Midstream   Result Value Ref Range    Color Urine  Colorless Colorless, Straw, Light Yellow, Yellow    Appearance Urine Clear Clear    Glucose Urine Negative Negative mg/dL    Bilirubin Urine Negative Negative    Ketones Urine Negative Negative mg/dL    Specific Gravity Urine 1.004 1.001 - 1.030    Blood Urine Negative Negative    pH Urine 6.5 5.0 - 7.0    Protein Albumin Urine Negative Negative mg/dL    Urobilinogen Urine <2.0 <2.0 mg/dL    Nitrite Urine Negative Negative    Leukocyte Esterase Urine Negative Negative    Mucus Urine Present (A) None Seen /LPF    RBC Urine <1 <=2 /HPF    WBC Urine <1 <=5 /HPF    Narrative    Urine Culture not indicated   Troponin T, High Sensitivity (now)     Status: Normal   Result Value Ref Range    Troponin T, High Sensitivity 10 <=22 ng/L   N terminal pro BNP outpatient     Status: Normal   Result Value Ref Range    N Terminal Pro BNP Outpatient <36 0 - 900 pg/mL   CBC with platelets differential     Status: Abnormal    Narrative    The following orders were created for panel order CBC with platelets differential.  Procedure                               Abnormality         Status                     ---------                               -----------         ------                     CBC with platelets and d...[[id number]]  Abnormal            Final result                 Please view results for these tests on the individual orders.        RADIOLOGY    Chest XR,  PA & LAT   Final Result   IMPRESSION: Heart size appears normal. Cephalization of blood flow to suggest pulmonary vascular congestion. Benign no pleural effusion. Mild patchy lung base opacities may represent prominent pulmonary vessels, and/or asymmetric edema.      Head CT w/o contrast    (Results Pending)       DISCHARGE MEDS  New Prescriptions    No medications on file         Gil Kumar MD   Emergency Medicine  [facility] EMERGENCY DEPARTMENT  [address] [place] [id number] [phone]     Gil Kumar MD  08/26/24  [id number]

## 2024-08-26 NOTE — DISCHARGE INSTRUCTIONS
Your blood cell count was elevated.  This could be a stress response from possibly passing out.  We are not seeing signs of infection today.  Return to the ER for development of fever or if you pass out again.  Recommend recheck with primary care doctor for consideration of echocardiogram or cardiac monitoring

## 2024-09-01 LAB
ATRIAL RATE - MUSE: 90 BPM
DIASTOLIC BLOOD PRESSURE - MUSE: NORMAL MMHG
INTERPRETATION ECG - MUSE: NORMAL
P AXIS - MUSE: 41 DEGREES
PR INTERVAL - MUSE: 130 MS
QRS DURATION - MUSE: 86 MS
QT - MUSE: 350 MS
QTC - MUSE: 428 MS
R AXIS - MUSE: 13 DEGREES
SYSTOLIC BLOOD PRESSURE - MUSE: NORMAL MMHG
T AXIS - MUSE: 34 DEGREES
VENTRICULAR RATE- MUSE: 90 BPM

## 2025-03-03 ENCOUNTER — TRANSFERRED RECORDS (OUTPATIENT)
Dept: HEALTH INFORMATION MANAGEMENT | Facility: CLINIC | Age: 50
End: 2025-03-03

## 2025-03-03 ENCOUNTER — HOSPITAL ENCOUNTER (EMERGENCY)
Facility: CLINIC | Age: 50
Discharge: HOME | End: 2025-03-03
Attending: EMERGENCY MEDICINE | Admitting: EMERGENCY MEDICINE
Payer: MEDICARE

## 2025-03-03 ENCOUNTER — TELEPHONE (OUTPATIENT)
Dept: OPHTHALMOLOGY | Facility: CLINIC | Age: 50
End: 2025-03-03
Payer: MEDICARE

## 2025-03-03 VITALS
WEIGHT: 185 LBS | TEMPERATURE: 98.3 F | DIASTOLIC BLOOD PRESSURE: 77 MMHG | HEIGHT: 67 IN | BODY MASS INDEX: 29.03 KG/M2 | HEART RATE: 82 BPM | RESPIRATION RATE: 17 BRPM | SYSTOLIC BLOOD PRESSURE: 117 MMHG | OXYGEN SATURATION: 97 %

## 2025-03-03 DIAGNOSIS — H16.002 CORNEAL ULCER OF LEFT EYE: ICD-10-CM

## 2025-03-03 LAB
KOH PREPARATION: NORMAL
KOH PREPARATION: NORMAL

## 2025-03-03 PROCEDURE — 87070 CULTURE OTHR SPECIMN AEROBIC: CPT

## 2025-03-03 PROCEDURE — 87075 CULTR BACTERIA EXCEPT BLOOD: CPT

## 2025-03-03 PROCEDURE — 87102 FUNGUS ISOLATION CULTURE: CPT

## 2025-03-03 PROCEDURE — 99284 EMERGENCY DEPT VISIT MOD MDM: CPT | Performed by: EMERGENCY MEDICINE

## 2025-03-03 PROCEDURE — 87210 SMEAR WET MOUNT SALINE/INK: CPT

## 2025-03-03 PROCEDURE — 99283 EMERGENCY DEPT VISIT LOW MDM: CPT | Performed by: EMERGENCY MEDICINE

## 2025-03-03 RX ORDER — MOXIFLOXACIN 5 MG/ML
SOLUTION/ DROPS OPHTHALMIC
COMMUNITY
Start: 2025-02-28 | End: 2025-03-03

## 2025-03-03 RX ORDER — TOBRAMYCIN 3 MG/ML
SOLUTION/ DROPS OPHTHALMIC
COMMUNITY
Start: 2025-02-28 | End: 2025-03-03

## 2025-03-03 RX ORDER — NEOMYCIN SULFATE, POLYMYXIN B SULFATE AND DEXAMETHASONE 3.5; 10000; 1 MG/ML; [USP'U]/ML; MG/ML
SUSPENSION/ DROPS OPHTHALMIC
COMMUNITY
Start: 2024-06-10

## 2025-03-03 ASSESSMENT — COLUMBIA-SUICIDE SEVERITY RATING SCALE - C-SSRS
6. HAVE YOU EVER DONE ANYTHING, STARTED TO DO ANYTHING, OR PREPARED TO DO ANYTHING TO END YOUR LIFE?: NO
1. IN THE PAST MONTH, HAVE YOU WISHED YOU WERE DEAD OR WISHED YOU COULD GO TO SLEEP AND NOT WAKE UP?: NO
2. HAVE YOU ACTUALLY HAD ANY THOUGHTS OF KILLING YOURSELF IN THE PAST MONTH?: NO

## 2025-03-03 ASSESSMENT — ACTIVITIES OF DAILY LIVING (ADL)
ADLS_ACUITY_SCORE: 41
ADLS_ACUITY_SCORE: 41

## 2025-03-03 NOTE — TELEPHONE ENCOUNTER
[phone]     Called pt's caregiver, Jessika, who stated she was told to take Levar to the ER and they are there now.     Jessika declines an appt at this time.     Tamara Perez RN on 3/3/2025 at 1:53 PM

## 2025-03-03 NOTE — CONSULTS
OPHTHALMOLOGY CONSULT NOTE  03/03/25    Patient: Levar Swan      ASSESSMENT/PLAN:     Corneal ulcer, left eye  Patient with history of ABMD and keratoconus both eyes with a suppurative 2.5 x 2.5 mm inferior corneal ulcer in the left eye along with small 1 x 1 mm corneal ulcer at 1:00 that was nonstaining.  Patient was Karyn negative. No clear view to back given corneal haze. History no contact use or swimming - though on Friday his caretaker saw him rubbing his eyes very aggressively. Patient denies anything getting in the eyes. Patient was on moxifloxacin every hour and tobramycin (not fortified) since 2/28/2025 when his symptoms all of a sudden started.  Differential at this time includes bacterial ulcer though acute rapidly progressing onset and not improvement with antibiotics which should keep the differential broad including PUK.  PLAN:  -Cornea culture performed at bedside; aerobic, anaerobic, fungal, KOH prep  -Start vancomycin fortified eyedrops every hour around-the-clock in the left eye  -Start tobramycin fortified eyedrops every hour around-the-clock in the left eye  -Return to cornea clinic on 3/5/2025, patient given instructions and time    It is our pleasure to participate in this patient's care and treatment. Please contact us with any further questions or concerns.    Discussed with Dr. Norman Da Silva and Dr. Olvera who agreed with this assessment and plan.     Ophthalmology will sign-off. Please contact ophthalmologist on call with any questions or concerns. We appreciate your care of this patient.    Thank you for entrusting us with your care  Refugio Rodriguez MD, PGY2  Ophthalmology Resident  [facility]    HISTORY OF PRESENTING ILLNESS:     Levar Swan is a 49 year old male history of keratoconus and trisomy 21 who presented on 3/3/2025 with concern for not improving corneal ulcer in the left eye.  Patient was told to come to the [place] from [facility].   Since Friday on 2/28/2025 patient has been having left eye pain and discharge.  Patient was evaluated to clinic at that time he was told he has a corneal ulcer that started on tobramycin (not fortified) and moxifloxacin every hour.  Patient says the ulcer happened all of a sudden and nothing hit it and nothing got into his eye.  He does not wear any contacts.  Nor does any swimming.  He currently lives with a group home and has a watcher.    Says that the ulcer has not gotten any better and is only gotten worse, it started all of a sudden.  When he followed up at [place] [facility] on 3/3/2025 he said it is only gotten worse even on the antibiotic regimen.    10+ review of systems were otherwise negative except for that which has been stated above.      OCULAR/MEDICAL/SURGICAL HISTORIES:     Past Ocular History:   Last eye exam: 3/3/2025  Previously diagnosed ocular conditions: Keratoconus both eyes; ABMD, both eyes  Prior eye surgery/laser: None  Contact lens wear: No  Glasses: Yes  Eyedrops: Moxifloxacin every hour, tobramycin every hour    Pertinent Systemic Medications:   None pertinent    Past Medical History:  See HPI pending cornea cultures    Past Surgical History:   History reviewed. No pertinent surgical history.    Family History:  No history of macular degeneration or glaucoma  No family history of ocular blindness  No family history of rheumatological diseases    Social History:  No tobacco use in prior documentation    EXAMINATION:     Base Eye Exam       Visual Acuity (Snellen - Linear)         Right Left    Dist cc 20/200 HM              Tonometry (Tonopen, 4:13 PM)         Right Left    Pressure 16 Deferred              Pupils         Dark Light Shape React    Right 3 2 Round Brisk    Left Unable      Patient squeezing, unable to check left pupil             Visual Fields    Patient having difficulty understanding exam             Extraocular Movement         Right Left     Full, Ortho Full,  Ortho                  Slit Lamp and Fundus Exam       External Exam         Right Left    External Normal Normal              Slit Lamp Exam         Right Left    Lids/Lashes Normal Lid edema    Conjunctiva/Sclera trace injection 3+ hyperemia, mucoid discharge, chemosis primarily temporal    Cornea 360 KNV, no opacity or ulceration See picture; inferior 6 o'clock 2.5 x 2.5 mm suppurative ulceration with overlying mucous, from 6 to 1 o'clock  on the periphery there is epithelial sloughing. At 1 o'clock there is a 1x1 mm non-staining infiltrate. Diffuse d-folds    Anterior Chamber Deep and quiet Deep, though no clear view to assess cells, no obious hypopyon apparent    Iris Round and reactive Round and reactive    Lens Clear No view    Anterior Vitreous Normal No view                    Labs/Studies/Imaging Performed  Pending cornea cultures     Refugio Rodriguez MD  Resident Physician, PGY2  Department of Ophthalmology  03/03/25 4:17 PM

## 2025-03-03 NOTE — DISCHARGE INSTRUCTIONS
- Clinic is located at the following address:     [facility], 9th Floor  [address] [place] [address] [phone]     Your appointment is at 8:45 AM on Wednesday 3/5/2025.

## 2025-03-03 NOTE — ED PROVIDER NOTES
"    [facility] EMERGENCY DEPARTMENT ([facility])    3/03/25      ED PROVIDER NOTE   ED 2012:13 PM   History     Chief Complaint   Patient presents with    Eye Problem     The history is provided by the patient and medical records.     Levar Swan is a 49 year old male with history of Down syndrome who presents to the emergency department from [facility] further evaluation of a persistent corneal ulcer in his left eye.  He has been taking antibiotics x 3 days as prescribed but his corneal ulcer is getting worse. Patient was instructed to come to the ED for further evaluation.  He wears glasses, does not wear contact lenses.     Past Medical History  History reviewed. No pertinent past medical history.  History reviewed. No pertinent surgical history.  moxifloxacin (VIGAMOX) 0.5 % ophthalmic solution  neomycin-polymyxin-dexAMETHasone (MAXITROL) 3.5-56463-6.1 SUSP ophthalmic susp  tobramycin (TOBREX) 0.3 % ophthalmic solution      Allergies   Allergen Reactions    Penicillins Unknown     Family History  No family history on file.  Social History   Social History     Tobacco Use    Smoking status: Never   Substance Use Topics    Alcohol use: No    Drug use: No      A medically appropriate review of systems was performed with pertinent positives and negatives noted in the HPI, and all other systems negative.    Physical Exam   BP: 131/85  Pulse: 78  Temp: 98.3  F (36.8  C)  Resp: 18  Height: 170.2 cm (5' 7\")  Weight: 83.9 kg (185 lb)  SpO2: 98 %    Physical Exam  Physical Exam   Constitutional: oriented to person, place, and time. appears well-developed and well-nourished.   HENT:   Head: Normocephalic and atraumatic.  Eye exam deferred as ophthalmology is here seeing the patient  Neck: Normal range of motion.   Pulmonary/Chest: Effort normal. No respiratory distress.   Cardiac: No murmurs, rubs, gallops. RRR.  Abdominal: Abdomen soft, nontender, nondistended. No rebound tenderness.  MSK: Long bones " without deformity or evidence of trauma  Neurological: alert and oriented to person, place, and time.   Skin: Skin is warm and dry.   Psychiatric:  normal mood and affect.  behavior is normal. Thought content normal.      ED Course, Procedures, & Data      Procedures       A consult was attained from the ophthalmology service. The case was discussed with Ophthalmology resident Dr. Refugio Rodriguez from that service. The consulting service's recommendations were provided at 2:27 PM.     No results found for any visits on 03/03/25.  Medications - No data to display  Labs Ordered and Resulted from Time of ED Arrival to Time of ED Departure - No data to display  No orders to display          Critical care was not performed.     Medical Decision Making  The patient's presentation was of high complexity (an acute health issue posing potential threat to life or bodily function).    The patient's evaluation involved:  review of external note(s) from 1 sources (note from optometry clinic today)  discussion of management or test interpretation with another health professional (optometrist, ophthalmologist)    The patient's management necessitated only low risk treatment.    Assessment & Plan    Levar Swan is a 49 year old male who presents with worsening corneal ulcer in the left eye.  Patient seen by ophthalmology.  Please see their separate note.  They will follow him up in clinic.  They did adjust the medications and put in orders for him.  They know they can return if they have any further concerns.    I have reviewed the nursing notes. I have reviewed the findings, diagnosis, plan and need for follow up with the patient.    New Prescriptions    No medications on file       Final diagnoses:   Corneal ulcer of left eye     I, Nicole Beverly, am serving as a trained medical scribe to document services personally performed by Desmond Saeed MD based on the provider's statements to me on March 3, 2025.  This document has  been checked and approved by the attending provider.    I, Desmond Saeed MD, was physically present and have reviewed and verified the accuracy of this note documented by Nicole Beverly, medical scribe.      Desmond Saeed MD   [facility] EMERGENCY DEPARTMENT  3/3/2025        Desmond Saeed MD  03/03/25 3230

## 2025-03-03 NOTE — TELEPHONE ENCOUNTER
[facility] Call Center    Phone Message    May a detailed message be left on voicemail: yes     Reason for Call: Appointment Intake    Referring Provider Name: [facility], Referral on the way  Diagnosis and/or Symptoms: Cornea ulcer.  Please call Mari, she states pt has Down's. She states they would like the patient seen today if possible.    Action Taken: Message routed to:  [facility] Ophthalmology    Travel Screening: Not Applicable     Date of Service:

## 2025-03-03 NOTE — TELEPHONE ENCOUNTER
Mari from Dr. Anibal Ybarra [facility] is calling back, stating Jessika the patient's caregiver can be called to schedule the appointment, and her number is [phone].  Thank you.

## 2025-03-03 NOTE — ED TRIAGE NOTES
Patient from [facility]. Has corneal ulcer that is not getting better. Using medications as prescribed. Reports they tried to send him to the  eye clinic but they refused to see him so they were told to come to the ED.      Triage Assessment (Adult)       Row Name 03/03/25 1206          Triage Assessment    Airway WDL WDL        Respiratory WDL    Respiratory WDL WDL        Skin Circulation/Temperature WDL    Skin Circulation/Temperature WDL WDL        Cardiac WDL    Cardiac WDL WDL        Peripheral/Neurovascular WDL    Peripheral Neurovascular WDL WDL        Cognitive/Neuro/Behavioral WDL    Cognitive/Neuro/Behavioral WDL WDL

## 2025-03-05 NOTE — TELEPHONE ENCOUNTER
[facility] Call Center    Phone Message    May a detailed message be left on voicemail: yes     Reason for Call: Other: Jessika from group home called to reschedule apt today with Dr Olvera due to weather conditions they're not able to leave their driveway. Next available not until April and patient need to be seen sooner.     Please call Yosef back at [phone]. Thank you.    Action Taken: Message routed to:  [facility] Eye    Travel Screening: Not Applicable

## 2025-03-05 NOTE — TELEPHONE ENCOUNTER
Spoke to Jessika.  Dr. Olvera had cancellation for Friday March 7th at 845am.  They will make it work.  Aware of date/time/location.     Erin Luque on 3/5/2025 at 7:28 AM

## 2025-03-06 LAB
BACTERIA TISS BX CULT: NO GROWTH
BACTERIA TISS BX CULT: NORMAL
BACTERIA TISS BX CULT: NORMAL
GRAM STAIN RESULT: NORMAL
GRAM STAIN RESULT: NORMAL

## 2025-03-07 ENCOUNTER — ANESTHESIA (OUTPATIENT)
Dept: SURGERY | Facility: CLINIC | Age: 50
End: 2025-03-07
Payer: MEDICARE

## 2025-03-07 ENCOUNTER — HOSPITAL ENCOUNTER (OUTPATIENT)
Facility: CLINIC | Age: 50
Discharge: NURSING, REHAB OR LONG-TERM CARE | End: 2025-03-07
Attending: OPHTHALMOLOGY | Admitting: OPHTHALMOLOGY
Payer: MEDICARE

## 2025-03-07 ENCOUNTER — ANESTHESIA EVENT (OUTPATIENT)
Dept: SURGERY | Facility: CLINIC | Age: 50
End: 2025-03-07
Payer: MEDICARE

## 2025-03-07 ENCOUNTER — OFFICE VISIT (OUTPATIENT)
Dept: OPHTHALMOLOGY | Facility: CLINIC | Age: 50
End: 2025-03-07
Attending: OPHTHALMOLOGY
Payer: MEDICARE

## 2025-03-07 VITALS
BODY MASS INDEX: 31.28 KG/M2 | HEART RATE: 112 BPM | OXYGEN SATURATION: 94 % | WEIGHT: 183.2 LBS | DIASTOLIC BLOOD PRESSURE: 69 MMHG | HEIGHT: 64 IN | TEMPERATURE: 99.7 F | RESPIRATION RATE: 18 BRPM | SYSTOLIC BLOOD PRESSURE: 126 MMHG

## 2025-03-07 DIAGNOSIS — H16.072 PERFORATED CORNEAL ULCER OF LEFT EYE: Primary | ICD-10-CM

## 2025-03-07 DIAGNOSIS — H16.072 CORNEAL PERFORATION OF LEFT EYE: ICD-10-CM

## 2025-03-07 PROCEDURE — 250N000011 HC RX IP 250 OP 636

## 2025-03-07 PROCEDURE — 370N000017 HC ANESTHESIA TECHNICAL FEE, PER MIN: Performed by: OPHTHALMOLOGY

## 2025-03-07 PROCEDURE — 65730 CORNEAL TRANSPLANT: CPT | Mod: LT | Performed by: OPHTHALMOLOGY

## 2025-03-07 PROCEDURE — 76512 OPH US DX B-SCAN: CPT | Performed by: OPHTHALMOLOGY

## 2025-03-07 PROCEDURE — 87102 FUNGUS ISOLATION CULTURE: CPT | Performed by: OPHTHALMOLOGY

## 2025-03-07 PROCEDURE — 250N000009 HC RX 250

## 2025-03-07 PROCEDURE — 258N000003 HC RX IP 258 OP 636: Performed by: OPHTHALMOLOGY

## 2025-03-07 PROCEDURE — 710N000012 HC RECOVERY PHASE 2, PER MINUTE: Performed by: OPHTHALMOLOGY

## 2025-03-07 PROCEDURE — 710N000011 HC RECOVERY PHASE 1, LEVEL 3, PER MIN: Performed by: OPHTHALMOLOGY

## 2025-03-07 PROCEDURE — 272N000001 HC OR GENERAL SUPPLY STERILE: Performed by: OPHTHALMOLOGY

## 2025-03-07 PROCEDURE — 250N000011 HC RX IP 250 OP 636: Performed by: OPHTHALMOLOGY

## 2025-03-07 PROCEDURE — 999N000141 HC STATISTIC PRE-PROCEDURE NURSING ASSESSMENT: Performed by: OPHTHALMOLOGY

## 2025-03-07 PROCEDURE — 99205 OFFICE O/P NEW HI 60 MIN: CPT | Mod: 57 | Performed by: OPHTHALMOLOGY

## 2025-03-07 PROCEDURE — 250N000025 HC SEVOFLURANE, PER MIN: Performed by: OPHTHALMOLOGY

## 2025-03-07 PROCEDURE — 360N000077 HC SURGERY LEVEL 4, PER MIN: Performed by: OPHTHALMOLOGY

## 2025-03-07 PROCEDURE — G0463 HOSPITAL OUTPT CLINIC VISIT: HCPCS | Performed by: OPHTHALMOLOGY

## 2025-03-07 PROCEDURE — 87075 CULTR BACTERIA EXCEPT BLOOD: CPT | Performed by: OPHTHALMOLOGY

## 2025-03-07 PROCEDURE — 87205 SMEAR GRAM STAIN: CPT | Performed by: OPHTHALMOLOGY

## 2025-03-07 PROCEDURE — 258N000003 HC RX IP 258 OP 636

## 2025-03-07 PROCEDURE — 250N000009 HC RX 250: Performed by: OPHTHALMOLOGY

## 2025-03-07 PROCEDURE — 87070 CULTURE OTHR SPECIMN AEROBIC: CPT | Performed by: OPHTHALMOLOGY

## 2025-03-07 PROCEDURE — V2785 CORNEAL TISSUE PROCESSING: HCPCS | Performed by: OPHTHALMOLOGY

## 2025-03-07 DEVICE — EYE CORNEA PROCESS FEE FOR MN LIONS BANK: Type: IMPLANTABLE DEVICE | Site: EYE | Status: FUNCTIONAL

## 2025-03-07 RX ORDER — MULTIVIT WITH MINERALS/LUTEIN
1000 TABLET ORAL DAILY
COMMUNITY

## 2025-03-07 RX ORDER — SODIUM CHLORIDE, SODIUM LACTATE, POTASSIUM CHLORIDE, CALCIUM CHLORIDE 600; 310; 30; 20 MG/100ML; MG/100ML; MG/100ML; MG/100ML
INJECTION, SOLUTION INTRAVENOUS CONTINUOUS
Status: DISCONTINUED | OUTPATIENT
Start: 2025-03-07 | End: 2025-03-08 | Stop reason: HOSPADM

## 2025-03-07 RX ORDER — ONDANSETRON 4 MG/1
4 TABLET, ORALLY DISINTEGRATING ORAL EVERY 30 MIN PRN
Status: DISCONTINUED | OUTPATIENT
Start: 2025-03-07 | End: 2025-03-08 | Stop reason: HOSPADM

## 2025-03-07 RX ORDER — LIDOCAINE HYDROCHLORIDE 20 MG/ML
INJECTION, SOLUTION INFILTRATION; PERINEURAL PRN
Status: DISCONTINUED | OUTPATIENT
Start: 2025-03-07 | End: 2025-03-07

## 2025-03-07 RX ORDER — KETOROLAC TROMETHAMINE 30 MG/ML
INJECTION, SOLUTION INTRAMUSCULAR; INTRAVENOUS PRN
Status: DISCONTINUED | OUTPATIENT
Start: 2025-03-07 | End: 2025-03-07

## 2025-03-07 RX ORDER — SODIUM CHLORIDE, SODIUM LACTATE, POTASSIUM CHLORIDE, CALCIUM CHLORIDE 600; 310; 30; 20 MG/100ML; MG/100ML; MG/100ML; MG/100ML
INJECTION, SOLUTION INTRAVENOUS CONTINUOUS PRN
Status: DISCONTINUED | OUTPATIENT
Start: 2025-03-07 | End: 2025-03-07

## 2025-03-07 RX ORDER — NALOXONE HYDROCHLORIDE 0.4 MG/ML
0.1 INJECTION, SOLUTION INTRAMUSCULAR; INTRAVENOUS; SUBCUTANEOUS
Status: DISCONTINUED | OUTPATIENT
Start: 2025-03-07 | End: 2025-03-08 | Stop reason: HOSPADM

## 2025-03-07 RX ORDER — FENTANYL CITRATE 50 UG/ML
INJECTION, SOLUTION INTRAMUSCULAR; INTRAVENOUS PRN
Status: DISCONTINUED | OUTPATIENT
Start: 2025-03-07 | End: 2025-03-07

## 2025-03-07 RX ORDER — BALANCED SALT SOLUTION 6.4; .75; .48; .3; 3.9; 1.7 MG/ML; MG/ML; MG/ML; MG/ML; MG/ML; MG/ML
SOLUTION OPHTHALMIC PRN
Status: DISCONTINUED | OUTPATIENT
Start: 2025-03-07 | End: 2025-03-08 | Stop reason: HOSPADM

## 2025-03-07 RX ORDER — PROPOFOL 10 MG/ML
INJECTION, EMULSION INTRAVENOUS PRN
Status: DISCONTINUED | OUTPATIENT
Start: 2025-03-07 | End: 2025-03-07

## 2025-03-07 RX ORDER — OMEPRAZOLE 40 MG/1
40 CAPSULE, DELAYED RELEASE ORAL DAILY
COMMUNITY

## 2025-03-07 RX ORDER — DEXAMETHASONE SODIUM PHOSPHATE 4 MG/ML
4 INJECTION, SOLUTION INTRA-ARTICULAR; INTRALESIONAL; INTRAMUSCULAR; INTRAVENOUS; SOFT TISSUE
Status: DISCONTINUED | OUTPATIENT
Start: 2025-03-07 | End: 2025-03-08 | Stop reason: HOSPADM

## 2025-03-07 RX ORDER — ONDANSETRON 2 MG/ML
4 INJECTION INTRAMUSCULAR; INTRAVENOUS EVERY 30 MIN PRN
Status: DISCONTINUED | OUTPATIENT
Start: 2025-03-07 | End: 2025-03-08 | Stop reason: HOSPADM

## 2025-03-07 RX ORDER — ONDANSETRON 2 MG/ML
INJECTION INTRAMUSCULAR; INTRAVENOUS PRN
Status: DISCONTINUED | OUTPATIENT
Start: 2025-03-07 | End: 2025-03-07

## 2025-03-07 RX ORDER — DEXAMETHASONE SODIUM PHOSPHATE 4 MG/ML
INJECTION, SOLUTION INTRA-ARTICULAR; INTRALESIONAL; INTRAMUSCULAR; INTRAVENOUS; SOFT TISSUE PRN
Status: DISCONTINUED | OUTPATIENT
Start: 2025-03-07 | End: 2025-03-07

## 2025-03-07 RX ORDER — CEFAZOLIN SODIUM/WATER 2 G/20 ML
2 SYRINGE (ML) INTRAVENOUS EVERY 8 HOURS
Status: DISCONTINUED | OUTPATIENT
Start: 2025-03-07 | End: 2025-03-08 | Stop reason: HOSPADM

## 2025-03-07 RX ORDER — FENTANYL CITRATE 50 UG/ML
25 INJECTION, SOLUTION INTRAMUSCULAR; INTRAVENOUS EVERY 5 MIN PRN
Status: DISCONTINUED | OUTPATIENT
Start: 2025-03-07 | End: 2025-03-08 | Stop reason: HOSPADM

## 2025-03-07 RX ORDER — ACETAMINOPHEN 325 MG/1
975 TABLET ORAL
Status: DISCONTINUED | OUTPATIENT
Start: 2025-03-07 | End: 2025-03-08 | Stop reason: HOSPADM

## 2025-03-07 RX ORDER — FENTANYL CITRATE-0.9 % NACL/PF 10 MCG/ML
PLASTIC BAG, INJECTION (ML) INTRAVENOUS PRN
Status: DISCONTINUED | OUTPATIENT
Start: 2025-03-07 | End: 2025-03-07

## 2025-03-07 RX ORDER — DEXAMETHASONE SODIUM PHOSPHATE 4 MG/ML
INJECTION, SOLUTION INTRA-ARTICULAR; INTRALESIONAL; INTRAMUSCULAR; INTRAVENOUS; SOFT TISSUE PRN
Status: DISCONTINUED | OUTPATIENT
Start: 2025-03-07 | End: 2025-03-08 | Stop reason: HOSPADM

## 2025-03-07 RX ADMIN — SODIUM CHLORIDE, POTASSIUM CHLORIDE, SODIUM LACTATE AND CALCIUM CHLORIDE: 600; 310; 30; 20 INJECTION, SOLUTION INTRAVENOUS at 18:55

## 2025-03-07 RX ADMIN — KETOROLAC TROMETHAMINE 15 MG: 30 INJECTION, SOLUTION INTRAMUSCULAR at 20:36

## 2025-03-07 RX ADMIN — FENTANYL CITRATE 25 MCG: 50 INJECTION INTRAMUSCULAR; INTRAVENOUS at 20:22

## 2025-03-07 RX ADMIN — Medication 100 MCG: at 20:46

## 2025-03-07 RX ADMIN — Medication 2 G: at 19:04

## 2025-03-07 RX ADMIN — PROPOFOL 40 MG: 10 INJECTION, EMULSION INTRAVENOUS at 19:59

## 2025-03-07 RX ADMIN — PROPOFOL 120 MG: 10 INJECTION, EMULSION INTRAVENOUS at 19:02

## 2025-03-07 RX ADMIN — FENTANYL CITRATE 75 MCG: 50 INJECTION INTRAMUSCULAR; INTRAVENOUS at 19:02

## 2025-03-07 RX ADMIN — LIDOCAINE HYDROCHLORIDE 80 MG: 20 INJECTION, SOLUTION INFILTRATION; PERINEURAL at 19:02

## 2025-03-07 RX ADMIN — SUGAMMADEX 100 MG: 100 INJECTION, SOLUTION INTRAVENOUS at 20:36

## 2025-03-07 RX ADMIN — VANCOMYCIN HYDROCHLORIDE 25 MG: 500 INJECTION, POWDER, LYOPHILIZED, FOR SOLUTION INTRAVENOUS at 20:30

## 2025-03-07 RX ADMIN — Medication 50 MG: at 19:03

## 2025-03-07 RX ADMIN — MIDAZOLAM 1 MG: 1 INJECTION INTRAMUSCULAR; INTRAVENOUS at 18:56

## 2025-03-07 RX ADMIN — ONDANSETRON 4 MG: 2 INJECTION INTRAMUSCULAR; INTRAVENOUS at 20:30

## 2025-03-07 RX ADMIN — DEXMEDETOMIDINE HYDROCHLORIDE 10 MCG: 100 INJECTION, SOLUTION INTRAVENOUS at 19:12

## 2025-03-07 RX ADMIN — DEXAMETHASONE SODIUM PHOSPHATE 4 MG: 4 INJECTION, SOLUTION INTRAMUSCULAR; INTRAVENOUS at 19:03

## 2025-03-07 ASSESSMENT — SLIT LAMP EXAM - LIDS
COMMENTS: NORMAL
COMMENTS: LID EDEMA

## 2025-03-07 ASSESSMENT — TONOMETRY
IOP_METHOD: TONOPEN
OS_IOP_MMHG: 8
OD_IOP_MMHG: 12

## 2025-03-07 ASSESSMENT — VISUAL ACUITY
OD_CC: 20/60
METHOD: SNELLEN - LINEAR
OS_SC: NLP
CORRECTION_TYPE: GLASSES

## 2025-03-07 ASSESSMENT — ACTIVITIES OF DAILY LIVING (ADL)
ADLS_ACUITY_SCORE: 41

## 2025-03-07 ASSESSMENT — EXTERNAL EXAM - LEFT EYE: OS_EXAM: NORMAL

## 2025-03-07 ASSESSMENT — EXTERNAL EXAM - RIGHT EYE: OD_EXAM: NORMAL

## 2025-03-07 NOTE — NURSING NOTE
"Chief Complaints and History of Present Illnesses   Patient presents with    corneal ulcer os      Chief Complaint(s) and History of Present Illness(es)       corneal ulcer os                Comments    Jessika - \"staff\" is present today - she puts drops in round the clock.   Patient is somewhat verbal.   Vision blurry os per patient.   She said he has told her he is not in pain.   Patient states eye is red.     Ocular meds:   vancomycin fortified eyedrops every hour around-the-clock in the left eye- every hour  tobramycin fortified eyedrops every hour around-the-clock in the left eye- every hour    Carlotta GUNTER, March 7, 2025 8:34 AM                         "

## 2025-03-07 NOTE — NURSING NOTE
"Chief Complaints and History of Present Illnesses   Patient presents with    corneal ulcer os      Chief Complaint(s) and History of Present Illness(es)       corneal ulcer os                Comments    Jessika - \"staff\" is present today - she puts drops in round the clock.   Patient is somewhat verbal.   Vision blurry os per patient.   Photophobic.   She said he has told her he is not in pain.   Patient states eye is red.     Ocular meds:   vancomycin fortified eyedrops every hour around-the-clock in the left eye- every hour  tobramycin fortified eyedrops every hour around-the-clock in the left eye- every hour    Carlotta GUNTER, March 7, 2025 8:34 AM                         "

## 2025-03-07 NOTE — PROGRESS NOTES
"Chief complaint   Keratoconus OU  Corneal Ulcer OS    HPI   Levar Swan is a 49 year old male history of keratoconus and trisomy 21 who presented on 3/3/2025 with concern for not improving corneal ulcer in the left eye.  Patient was told to come to the [place] from [facility].  Since Friday on 2/28/2025 patient has been having left eye pain and discharge.  Patient was evaluated to clinic at that time he was told he has a corneal ulcer that started on tobramycin (not fortified) and moxifloxacin every hour.  Patient says the ulcer happened all of a sudden and nothing hit it and nothing got into his eye.  He does not wear any contacts.  Nor does any swimming.  He currently lives with a group home and has a watcher.     Says that the ulcer has not gotten any better and is only gotten worse, it started all of a sudden.  When he followed up at [facility] on 3/3/2025 he said it is only gotten worse even on the antibiotic regimen.      Interval hx 03/07/2025    Chief Complaint(s) and History of Present Illness(es)       corneal ulcer os                Comments    Jessika - \"staff\" is present today - she puts drops in round the clock.   Patient is somewhat verbal.   Vision blurry os per patient.   Photophobic.   She said he has told her he is not in pain.   Patient states eye is red.     Ocular meds:   vancomycin fortified eyedrops every hour around-the-clock in the left eye- every hour  tobramycin fortified eyedrops every hour around-the-clock in the left eye- every hour    Carlotta GUNTER, March 7, 2025 8:34 AM                           Past ocular history   Prior eye surgery/laser/Trauma: None  CTL wearer:No  Glasses : Yes  Family Hx of eye disease: None    PMH   History reviewed. No pertinent past medical history.    PSH   History reviewed. No pertinent surgical history.    Meds     Current Outpatient Medications   Medication Sig Dispense Refill    neomycin-polymyxin-dexAMETHasone (MAXITROL) " 3.5-05463-6.1 SUSP ophthalmic susp       tobramycin (TOBREX) 15 mg/mL compounded ophthalmic solution Place 1 drop Into the left eye every hour. 10 mL 1    vancomycin (VANCOCIN) 25 mg/mL in hypromellose 0.3% cmpd ophthalmic solution Place 1 drop Into the left eye every hour. 10 mL 1     No current facility-administered medications for this visit.       Labs   Corneal ulcer cultures 3/3/25: negative    Imaging   None    Drops Currently Taking   Vancomycin 25 mg / mL compounded q1h   Tobramycin 15 mg / mL compounded q1h  Vitamin C 1000 mg QD    Assessment/Plan 03/07/2025   # Corneal ulcer, left eye  #corneal perforation OS  - Onset 2/27/25  - Not associated with contact lens wear, no known trauma or inciting event  - Pohx only notable for Trisomy 21 w/ keratoconus  - Seen at [facility] and given moxifloxacin and non-fortified tobramycin drops with no improvement  - Started on q1h fortified vanc / tobra on 3/3/25 at ED visit  Vision 20/200 previously  Today patient has perforation with flat AC    Plan   Plan for emergent surgery today  Discussed with family Risks, benefits and alternatives of PK with the patients. Discussed risk of bleeding, infection, rejection, failure, losing vision.told patient may still need glasses or CL for vision recovery after     Follow up:  Oph: sent to OR TODAY      Attending Physician Attestation:  Complete documentation of historical and exam elements from today's encounter can be found in the full encounter summary report (not reduplicated in this progress note).  I personally obtained the chief complaint(s) and history of present illness.  I confirmed and edited as necessary the review of systems, past medical/surgical history, family history, social history, and examination findings as documented by others; and I examined the patient myself.  I personally reviewed the relevant tests, images, and reports as documented above.  I formulated and edited as necessary the assessment and plan  and discussed the findings and management plan with the patient and family. - Elvi Olvera MD

## 2025-03-08 ENCOUNTER — OFFICE VISIT (OUTPATIENT)
Dept: OPHTHALMOLOGY | Facility: CLINIC | Age: 50
End: 2025-03-08
Payer: MEDICARE

## 2025-03-08 DIAGNOSIS — Z98.890 POSTOPERATIVE EYE STATE: Primary | ICD-10-CM

## 2025-03-08 LAB
BACTERIA SPEC CULT: NORMAL
BACTERIA SPEC CULT: NORMAL
GRAM STAIN RESULT: NORMAL

## 2025-03-08 RX ORDER — PREDNISOLONE ACETATE 10 MG/ML
1-2 SUSPENSION/ DROPS OPHTHALMIC 4 TIMES DAILY
Qty: 15 ML | Refills: 11 | Status: SHIPPED | OUTPATIENT
Start: 2025-03-08

## 2025-03-08 ASSESSMENT — TONOMETRY
OD_IOP_MMHG: 18
IOP_METHOD: TONOPEN
OS_IOP_MMHG: 9

## 2025-03-08 ASSESSMENT — EXTERNAL EXAM - LEFT EYE: OS_EXAM: NORMAL

## 2025-03-08 ASSESSMENT — VISUAL ACUITY
METHOD: SNELLEN - LINEAR
OD_CC: 20/100
OS_CC: CF 1'

## 2025-03-08 ASSESSMENT — SLIT LAMP EXAM - LIDS
COMMENTS: LID EDEMA
COMMENTS: NORMAL

## 2025-03-08 ASSESSMENT — EXTERNAL EXAM - RIGHT EYE: OD_EXAM: NORMAL

## 2025-03-08 NOTE — ANESTHESIA PROCEDURE NOTES
Airway       Patient location during procedure: OR       Procedure Start/Stop Times: 3/7/2025 7:06 PM  Staff -        Anesthesiologist:  Kristy Cadet MD       CRNA: Dominique Briggs APRN CRNA       Performed By: CRNA  Consent for Airway        Urgency: elective  Indications and Patient Condition       Indications for airway management: leah-procedural       Induction type:intravenous       Mask difficulty assessment: 1 - vent by mask    Final Airway Details       Final airway type: endotracheal airway       Successful airway: ETT - single and Oral  Endotracheal Airway Details        ETT size (mm): 7.0       Cuffed: yes       Tracheal cuff pressure (cm H2O): 25       Successful intubation technique: video laryngoscopy       VL Blade Size: MAC 3       Grade View of Cords: 1       Adjucts: stylet       Position: Right       Measured from: gums/teeth       Secured at (cm): 23       Bite block used: None    Post intubation assessment        Placement verified by: capnometry, equal breath sounds and chest rise        Number of attempts at approach: 1       Number of other approaches attempted: 0       Secured with: tape       Ease of procedure: easy       Dentition: Unchanged and Intact    Medication(s) Administered   Medication Administration Time: 3/7/2025 7:06 PM

## 2025-03-08 NOTE — DISCHARGE INSTRUCTIONS
Day of Surgery Instructions: Penetrating keratoplasty Surgery      Activity:  Please wear your eye shield at night for 1 month, then stop. Secure it with tape, forehead to cheek.  Do not rub your eye as this could damage your surgery.   Stay out of adwoa and dirty environments while your eye is healing.  Refrain from strenuous activity and try not to bend below the waist for 1 week after surgery.  Do not swim or use a hot tub or a sauna for at least 4 weeks after surgery.  Shower and wash your face as you normally would, but do not rub your eye.  Do not wear eye make-up for 2 weeks after surgery.    Shield:   Please keep your eye shield on at all times until it is removed by our technicians tomorrow in clinic. Today is the only time you will have to wear your eye shield during the day.    Eye Drops:  Do not take any eye drops in your surgical eye until tomorrow unless asked to do otherwise by your doctor.  Bring your eye drops to clinic tomorrow.  Ask your doctor if you should resume taking eye drops that you were taking before surgery, such as glaucoma eye drops.    Other Medications:  Resume taking all of your usual medications.  Tylenol one pill every 4-6 hours as needed for pain. Pain medications will reduce the pain that you experience from your eye, but they will not completely eliminate the pain.  Do not combine these medications with alcohol.      What to expect:  All eyes are different, but in general you can expect comfort and vision to improve steadily.  Blurry vision and mild irritation, discomfort, tearing, and redness are normal.  Stinging with eye drops is normal.  If a contact lens was placed on your eye, it is usually removed between 4 and 14 days after surgery.  It is normal for your current pair of glasses or contact lenses to no longer work after you have eye surgery because the surgery changes your prescription.  You will be checked for a new prescription between 4-6 weeks after surgery, or when  your eye is fully healed.    Increasing redness, pain, extreme light sensitivity, and decreasing vision are not normal. If any of these symptoms occur, call and speak to the cornea doctor

## 2025-03-08 NOTE — ANESTHESIA CARE TRANSFER NOTE
Patient: Levar Swan    Procedure: Procedure(s):  Keratoplasty penetrating       Diagnosis: Perforated corneal ulcer of left eye [H16.072]  Diagnosis Additional Information: No value filed.    Anesthesia Type:   General     Note:    Oropharynx: oropharynx clear of all foreign objects and spontaneously breathing  Level of Consciousness: awake  Oxygen Supplementation: room air    Independent Airway: airway patency satisfactory and stable  Dentition: dentition unchanged  Vital Signs Stable: post-procedure vital signs reviewed and stable  Report to RN Given: handoff report given  Patient transferred to: PACU    Handoff Report: Identifed the Patient, Identified the Reponsible Provider, Reviewed the pertinent medical history, Discussed the surgical course, Reviewed Intra-OP anesthesia mangement and issues during anesthesia, Set expectations for post-procedure period and Allowed opportunity for questions and acknowledgement of understanding      Vitals:  Vitals Value Taken Time   /76 03/07/25 2054   Temp 37.1    Pulse 112 03/07/25 2059   Resp 18 03/07/25 2059   SpO2 95 % 03/07/25 2105   Vitals shown include unfiled device data.    Electronically Signed By: RONNI Kumar CRNA  March 7, 2025  9:06 PM

## 2025-03-08 NOTE — PATIENT INSTRUCTIONS
Take drops as follows in the LEFT EYE:    Fortified Vancomycin 6x/day    Fortified Tobramycin 6x/day    Prednisolone (pink cap) 4x/day    Follow up will next week (someone will call you)

## 2025-03-08 NOTE — PROGRESS NOTES
Chief complaint   Keratoconus OU  Corneal Ulcer OS    HPI   Levar Swan is a 49 year old male history of keratoconus and trisomy 21 who presented on 3/3/2025 with concern for not improving corneal ulcer in the left eye.  Patient was told to come to the [place] from [place] [facility].  Since Friday on 2/28/2025 patient has been having left eye pain and discharge.  Patient was evaluated to clinic at that time he was told he has a corneal ulcer that started on tobramycin (not fortified) and moxifloxacin every hour.  Patient says the ulcer happened all of a sudden and nothing hit it and nothing got into his eye.  He does not wear any contacts.  Nor does any swimming.  He currently lives with a group home and has a watcher.     Says that the ulcer has not gotten any better and is only gotten worse, it started all of a sudden.  When he followed up at [place] [facility] on 3/3/2025 he said it is only gotten worse even on the antibiotic regimen.      Interval hx 03/08/2025  Patient presents POD1 s/p PKP left eye. Slept well with no pain. Eye remained patched.         Past ocular history   Prior eye surgery/laser/Trauma: None  CTL wearer:No  Glasses : Yes  Family Hx of eye disease: None    PMH     Past Medical History:   Diagnosis Date    Gastroesophageal reflux disease with esophagitis     Sleep apnea        PSH   No past surgical history on file.    Meds     Current Outpatient Medications   Medication Sig Dispense Refill    Multiple Vitamins-Minerals (MULTIVITAMIN GUMMIES ADULT PO) Take by mouth.      neomycin-polymyxin-dexAMETHasone (MAXITROL) 3.5-85718-2.1 SUSP ophthalmic susp       omeprazole (PRILOSEC) 40 MG DR capsule Take 40 mg by mouth daily.      tobramycin (TOBREX) 15 mg/mL compounded ophthalmic solution Place 1 drop Into the left eye every hour. 10 mL 1    vancomycin (VANCOCIN) 25 mg/mL in hypromellose 0.3% cmpd ophthalmic solution Place 1 drop Into the left eye every hour. 10 mL 1    vitamin C  (ASCORBIC ACID) 1000 MG TABS Take 1,000 mg by mouth daily.       No current facility-administered medications for this visit.       Labs   Corneal ulcer cultures 3/3/25: negative    Imaging   None    Drops Currently Taking   Vancomycin 25 mg / mL compounded q1h   Tobramycin 15 mg / mL compounded q1h  Vitamin C 1000 mg QD    Assessment/Plan 03/08/2025   # Corneal ulcer, left eye  #corneal perforation OS  - Onset 2/27/25  - Not associated with contact lens wear, no known trauma or inciting event  - Pohx only notable for Trisomy 21 w/ keratoconus  - Seen at [facility] and given moxifloxacin and non-fortified tobramycin drops with no improvement  - Started on q1h fortified vanc / tobra on 3/3/25 at ED visit  - Vision 20/200 previously  - 3/7 patient has perforation with flat AC, taken for emergent PK  - 3/8 POD1 s/p PKP left eye     Plan:  Fortified vancomycin 6x/day, left eye  Fortified tobramycin 6x/day, left eye  Prednisolone QID left eye    Follow up:  Oph: 1 week    Flaco Greenwood MD  PGY-3 Ophthalmology Resident  [facility]    Patient discussed with Dr. Olvera

## 2025-03-08 NOTE — ANESTHESIA PREPROCEDURE EVALUATION
Anesthesia Pre-Procedure Evaluation    Patient: Levar Swan   MRN: [id number] : 1975        Procedure : Procedure(s):  Keratoplasty penetrating          Past Medical History:   Diagnosis Date    Sleep apnea       No past surgical history on file.   Allergies   Allergen Reactions    Penicillins Unknown      Social History     Tobacco Use    Smoking status: Never    Smokeless tobacco: Not on file   Substance Use Topics    Alcohol use: No      Wt Readings from Last 1 Encounters:   25 83.1 kg (183 lb 3.2 oz)        Anesthesia Evaluation   Pt has not had prior anesthetic         ROS/MED HX  ENT/Pulmonary:     (+) sleep apnea, doesn't use CPAP,                                   (-) asthma and recent URI   Neurologic:     (+)                         Developmental delay (lives in group home),       Cardiovascular: Comment: Father denies any cardiac history. Patient walks independenty - neg cardiovascular ROS  (-) murmur   METS/Exercise Tolerance:     Hematologic:    (-) history of blood clots   Musculoskeletal: Comment: Denies any history of neck problems      GI/Hepatic:     (+) GERD, Asymptomatic on medication,                  Renal/Genitourinary:  - neg Renal ROS     Endo:     (+)               Obesity,    (-) thyroid disease   Psychiatric/Substance Use:       Infectious Disease:       Malignancy:       Other: Comment: Corneal ulcer.  Father denies family h/o problems with anesthesia.           Physical Exam    Airway      Comment: Lg tongue    Mallampati: III   TM distance: < 3 FB   Neck ROM: full   Mouth opening: > 3 cm    Respiratory Devices and Support         Dental       (+) Minor Abnormalities - some fillings, tiny chips      Cardiovascular   cardiovascular exam normal       Rhythm and rate: regular and normal (-) no murmur    Pulmonary   pulmonary exam normal        breath sounds clear to auscultation           OUTSIDE LABS:  CBC:   Lab Results   Component Value Date    WBC 21.5 (H) 2024  "   WBC 7.5 10/27/2023    HGB 14.1 08/26/2024    HGB 14.1 10/27/2023    HCT 40.7 08/26/2024    HCT 39.9 (L) 10/27/2023     08/26/2024     10/27/2023     BMP:   Lab Results   Component Value Date     08/26/2024     04/19/2024    POTASSIUM 4.2 08/26/2024    POTASSIUM 3.9 04/19/2024    CHLORIDE 101 08/26/2024    CHLORIDE 104 04/19/2024    CO2 28 08/26/2024    CO2 25 04/19/2024    BUN 16.6 08/26/2024    BUN 15.4 04/19/2024    CR 1.17 08/26/2024    CR 0.96 04/19/2024     (H) 08/26/2024    GLC 88 04/19/2024     COAGS: No results found for: \"PTT\", \"INR\", \"FIBR\"  POC: No results found for: \"BGM\", \"HCG\", \"HCGS\"  HEPATIC:   Lab Results   Component Value Date    ALBUMIN 3.9 04/19/2024    PROTTOTAL 7.3 04/19/2024    ALT 20 04/19/2024    AST 23 04/19/2024    ALKPHOS 128 04/19/2024    BILITOTAL 0.3 04/19/2024     OTHER:   Lab Results   Component Value Date    LACT 1.1 08/26/2024    AKSHAT 8.4 (L) 08/26/2024    MAG 1.9 08/26/2024    TSH 2.51 11/17/2023       Anesthesia Plan    ASA Status:  2    NPO Status:  NPO Appropriate    Anesthesia Type: General.   Induction: Propofol.   Maintenance: Balanced.        Consents    Anesthesia Plan(s) and associated risks, benefits, and realistic alternatives discussed. Questions answered and patient/representative(s) expressed understanding.     - Discussed:     - Discussed with:  Parent (Mother and/or Father), Patient (Discussed with Levar and his dad)            Postoperative Care    Pain management: Oral pain medications, Multi-modal analgesia.   PONV prophylaxis: Dexamethasone or Solumedrol, Ondansetron (or other 5HT-3)     Comments:    Other Comments: Discussed risks of anesthesia including nausea, vomiting, sore throat, dental damage, cardiopulmonary complications, agitation, neurologic complications, and serious complications.    Discussed possibility of worsening of sleep apnea.           Kristy Cadet MD    I have reviewed the pertinent notes and labs in " "the chart from the past 30 days and (re)examined the patient.  Any updates or changes from those notes are reflected in this note.    Clinically Significant Risk Factors Present on Admission                             # Obesity: Estimated body mass index is 31.45 kg/m  as calculated from the following:    Height as of this encounter: 1.626 m (5' 4\").    Weight as of this encounter: 83.1 kg (183 lb 3.2 oz).                "

## 2025-03-08 NOTE — OR NURSING
Pt was very disoriented on arrival to PACU. 3 staff needed to keep pt from flipping over or attempting to touch his eye. Family brought back asap to help reassure pt. Pt was able to calm down and was reoriented by parents. Monitors had been removed due to pt thrashing on the cart.     Once reoriented pt was appropriate. Pt tolerated a full glass of water and was able to use the BR. Pt was not wanting to have any monitoring at this point. IV was removed once pt was dressed. Instructions gone over with care giver Phan's parents. No further questions from caregiver and pt was wheeled to lobby for discharge.

## 2025-03-08 NOTE — OP NOTE
Operative Report    Date of Operation: 03/07/2025  Pre-operative diagnosis:   1. Perforated corneal ulcer, left eye eye  Post-operative diagnosis: Same   Procedure(s):   1. Penetrating keratoplasty, left eye  2. Intracameral injection of antibiotic left eye  Surgeon(s): Dr. Elvi Olvera  Assistant: Dr. Flaco Greenwood  Findings: None   Blood Loss: 1 cc  Complications: None   Implant Name Type Inv. Item Serial No.  Lot No. LRB No. Used Action   EYE CORNEA PROCESS FEE FOR [employer] - [id number] Lens/Eye Implant EYE [employer]NE[employer] PROCESS FEE FOR [employer]  [employer] [employer]  Left 1 Implanted     INDICATION FOR PROCEDURE   The patient has been followed in our eye clinic for severe corneal ulcer that left a corneal scar after healing. the risks, including, but not limited to infection, loss of vision, loss of eye, need for more surgery, and bleeding, along with the benefits, alternatives, expectations, and the procedure itself were discussed at length with the patient who wished to proceed with surgery.   DESCRIPTION OF PROCEDURE   In the preoperative suite, the patient was identified, the surgical site marked and informed consent was obtained. The patient was the brought back to the operative suite where the appropriate anesthesia monitors were connected. The patient's left eye was then prepped and draped in the usual sterile fashion for ophthalmic surgery.  Following draping, a lid speculum was placed to the operative eye. Calipers were used to measure the cornea.  Attention was then directed towards the donor cornea. An 7.75 mm donor suction trephine was then used to trephinate the cornea. The donor rim and media were sent for routine aerobic, anaerobic, and fungal cultures.   Attention was then directed back to the patient's cornea. An 7.5 mm Hurd-Hessberg corneal trephine was used to trephinate the patient's cornea to 80% depth. A pair of 0.12 forceps and a supersharp blade were used to enter the AC  in a controlled fashion at 10 o'clock within the groove of the trephination. Healon was injected to fill the anterior chamber. Corneal scissors to the left and right were then used to completed excise the cornea along the trephination groove. Healon was then used to coat the open lamin and ocular surface. The donor cornea was then placed endothelial side down over the open-lamin. The cornea was sutured in place using 24 interrupted 10-0 nylon sutures. BSS on a 30G cannula was used to irrigate the anterior chamber and burp out residual Healon. The sutures were rotated and buried. The wound was checked and found to be watertight. The AC was noted to be formed and the pressure was noted to be adequate.  Ancef and vancomycin were injected intracameral and intrastroma in the area of infection and into the anterior chamber. Subconjunctival and intrastromal injections of Ancef, vancomycin, and Dexamethasone were administered to the inferior fornix. The lid speculum and drapes were carefully removed. Maxitrol ointment was placed to the operative eye. A patch and shield were taped over the eye. The patient was then taken to the recovery room in stable condition having tolerated the procedure well. The patient was discharged home in good condition. Patient is to follow-up next day at eye clinic for post-operative visit.

## 2025-03-09 NOTE — ANESTHESIA POSTPROCEDURE EVALUATION
Patient: Levar Swan    Procedure: Procedure(s):  Keratoplasty penetrating       Anesthesia Type:  General    Note:  Anesthesia Post Evaluation    Last vitals:  Vitals Value Taken Time   /69 03/07/25 2100   Temp 37.6  C (99.7  F) 03/07/25 2100   Pulse 112 03/07/25 2100   Resp 18 03/07/25 2100   SpO2 95 % 03/07/25 2105       Electronically Signed By: Kristy Cadet MD  March 9, 2025  7:57 AM

## 2025-03-09 NOTE — ANESTHESIA POSTPROCEDURE EVALUATION
Patient: Levar Swan    Procedure: Procedure(s):  Keratoplasty penetrating       Anesthesia Type:  General    Note:  Disposition: Outpatient   Postop Pain Control: Uneventful            Sign Out: Well controlled pain   PONV: No   Neuro/Psych: Uneventful            Sign Out: Acceptable/Baseline neuro status   Airway/Respiratory: Uneventful            Sign Out: Acceptable/Baseline resp. status   CV/Hemodynamics: Uneventful            Sign Out: Acceptable CV status; No obvious hypovolemia; No obvious fluid overload   Other NRE: NONE   DID A NON-ROUTINE EVENT OCCUR? No    Event details/Postop Comments:  Levar is awake, alert and wants go home. His parents, he, and home care attendant deny further questions re anesthesia. He is tolerating PO and denies significant pain. Satting mid 90s on room air.            Last vitals:  Vitals Value Taken Time   /69 03/07/25 2100   Temp 37.6  C (99.7  F) 03/07/25 2100   Pulse 112 03/07/25 2100   Resp 18 03/07/25 2100   SpO2 95 % 03/07/25 2105       Electronically Signed By: Kristy Cadet MD  March 9, 2025  7:55 AM

## 2025-03-10 LAB
BACTERIA TISS BX CULT: NO GROWTH
BACTERIA TISS BX CULT: NORMAL
GRAM STAIN RESULT: NORMAL
GRAM STAIN RESULT: NORMAL

## 2025-03-13 ENCOUNTER — MEDICAL CORRESPONDENCE (OUTPATIENT)
Dept: HEALTH INFORMATION MANAGEMENT | Facility: CLINIC | Age: 50
End: 2025-03-13
Payer: MEDICARE

## 2025-03-13 LAB
BACTERIA TISS BX CULT: NORMAL

## 2025-03-15 LAB
BACTERIA TISS BX CULT: NO GROWTH
BACTERIA TISS BX CULT: NO GROWTH
BACTERIA TISS BX CULT: NORMAL

## 2025-03-19 ENCOUNTER — OFFICE VISIT (OUTPATIENT)
Dept: OPHTHALMOLOGY | Facility: CLINIC | Age: 50
End: 2025-03-19
Attending: OPHTHALMOLOGY
Payer: MEDICARE

## 2025-03-19 DIAGNOSIS — Z94.7 POST CORNEAL TRANSPLANT: Primary | ICD-10-CM

## 2025-03-19 PROCEDURE — G0463 HOSPITAL OUTPT CLINIC VISIT: HCPCS | Performed by: OPHTHALMOLOGY

## 2025-03-19 RX ORDER — MOXIFLOXACIN 5 MG/ML
1 SOLUTION/ DROPS OPHTHALMIC 2 TIMES DAILY
Qty: 3 ML | Refills: 3 | Status: SHIPPED | OUTPATIENT
Start: 2025-03-19

## 2025-03-19 ASSESSMENT — SLIT LAMP EXAM - LIDS
COMMENTS: LID EDEMA
COMMENTS: NORMAL

## 2025-03-19 ASSESSMENT — VISUAL ACUITY
CORRECTION_TYPE: GLASSES
METHOD: SNELLEN - LINEAR
OD_CC: 20/80

## 2025-03-19 ASSESSMENT — TONOMETRY
IOP_METHOD: ICARE
OS_IOP_MMHG: 03
OD_IOP_MMHG: 06

## 2025-03-19 ASSESSMENT — EXTERNAL EXAM - RIGHT EYE: OD_EXAM: NORMAL

## 2025-03-19 ASSESSMENT — EXTERNAL EXAM - LEFT EYE: OS_EXAM: NORMAL

## 2025-03-19 NOTE — PROGRESS NOTES
Chief complaint   Keratoconus OU  Corneal Ulcer OS    HPI   Levar Swan is a 49 year old male history of keratoconus and trisomy 21 who presented on 3/3/2025 with concern for not improving corneal ulcer in the left eye.  Patient was told to come to the [place] from [place] [facility].  Since Friday on 2/28/2025 patient has been having left eye pain and discharge.  Patient was evaluated to clinic at that time he was told he has a corneal ulcer that started on tobramycin (not fortified) and moxifloxacin every hour.  Patient says the ulcer happened all of a sudden and nothing hit it and nothing got into his eye.  He does not wear any contacts.  Nor does any swimming.  He currently lives with a group home and has a watcher.     Says that the ulcer has not gotten any better and is only gotten worse, it started all of a sudden.  When he followed up at [place] [facility] on 3/3/2025 he said it is only gotten worse even on the antibiotic regimen.    Interval hx 03/19/2025    Chief Complaint(s) and History of Present Illness(es)       Post Op (Ophthalmology) Left Eye              Comments: POP Left eye PK on 03/07/25              Comments    The patient denies eye pain.    The patient is using Vanco, Tobra and Prednisolone four times daily in the Left eye.  He is using EES ointment three times daily in the Left eye.  Mendy Enciso, COA, COA 8:16 AM 03/19/2025                   Past ocular history   Prior eye surgery/laser/Trauma: None  CTL wearer:No  Glasses : Yes  Family Hx of eye disease: None    PMH     Past Medical History:   Diagnosis Date    Gastroesophageal reflux disease with esophagitis     Sleep apnea        PSH     Past Surgical History:   Procedure Laterality Date    KERATOPLASTY PENETRATING Left 03/07/2025    Procedure: LEFT PENETRATING KERATOPLASTY, INTRACAMERAL INJECTION OF ANTIBIOTIC;  Surgeon: Elvi Olvera MD;  Location: [facility] OR    [place] ANESTH,CORNEAL TRANSPLANT Left 03/07/2025       Meds      Current Outpatient Medications   Medication Sig Dispense Refill    erythromycin (ROMYCIN) 5 MG/GM ophthalmic ointment Place 0.5 inches Into the left eye 3 times daily. 3.5 g 11    Multiple Vitamins-Minerals (MULTIVITAMIN GUMMIES ADULT PO) Take by mouth.      omeprazole (PRILOSEC) 40 MG DR capsule Take 40 mg by mouth daily.      prednisoLONE acetate (PRED FORTE) 1 % ophthalmic suspension Place 1-2 drops Into the left eye 4 times daily. 15 mL 11    tobramycin (TOBREX) 15 mg/mL compounded ophthalmic solution Place 1 drop Into the left eye every hour. 10 mL 1    vancomycin (VANCOCIN) 25 mg/mL in hypromellose 0.3% cmpd ophthalmic solution Place 1 drop Into the left eye every hour. 10 mL 1    vitamin C (ASCORBIC ACID) 1000 MG TABS Take 1,000 mg by mouth daily.      neomycin-polymyxin-dexAMETHasone (MAXITROL) 3.5-02832-4.1 SUSP ophthalmic susp        No current facility-administered medications for this visit.       Labs   Corneal ulcer cultures 3/3/25: negative    Imaging   None    Drops Currently Taking   Vancomycin 25 mg / mL compounded four times a day  OS  Tobramycin 15 mg / mL compounded four times a day  OS  Pred four times a day  OS  Erythromycin ointment TID OS  Vitamin C 1000 mg QD    Assessment/Plan 03/19/2025   # Corneal ulcer, left eye  #corneal perforation OS  - Onset 2/27/25  Perforated ulcer s/p PK 3/7/25  Cornea with few D folds, epi defect 60%     Plan:   - cultures negative to date  - persistent inferior epi defect  - STOP vancomycin/tobramycin  - start moxifloxacin BID OS  - Pred QID  - Erythromycin TID  - Follow up 2 weeks    Norman Da Silva MD  PGY-3, Ophthalmology  [facility]    Attending Physician Attestation:  Complete documentation of historical and exam elements from today's encounter can be found in the full encounter summary report (not reduplicated in this progress note).  I personally obtained the chief complaint(s) and history of present illness.  I confirmed and edited as  necessary the review of systems, past medical/surgical history, family history, social history, and examination findings as documented by others; and I examined the patient myself.  I personally reviewed the relevant tests, images, and reports as documented above.  I formulated and edited as necessary the assessment and plan and discussed the findings and management plan with the patient and family. - Abdiaziz Briseno MD

## 2025-03-19 NOTE — NURSING NOTE
Chief Complaints and History of Present Illnesses   Patient presents with    Post Op (Ophthalmology) Left Eye     POP Left eye PK on 03/07/25     Chief Complaint(s) and History of Present Illness(es)       Post Op (Ophthalmology) Left Eye              Comments: POP Left eye PK on 03/07/25              Comments    The patient denies eye pain.    The patient is using Vanco, Tobra and Prednisolone four times daily in the Left eye.  He is using EES ointment three times daily in the Left eye.  Mendy Enciso, COA, COA 8:16 AM 03/19/2025

## 2025-03-20 LAB
BACTERIA TISS BX CULT: NORMAL

## 2025-03-27 LAB
BACTERIA TISS BX CULT: NORMAL

## 2025-03-31 LAB — BACTERIA TISS BX CULT: NO GROWTH

## 2025-04-02 ENCOUNTER — OFFICE VISIT (OUTPATIENT)
Dept: OPHTHALMOLOGY | Facility: CLINIC | Age: 50
End: 2025-04-02
Attending: OPHTHALMOLOGY
Payer: MEDICARE

## 2025-04-02 DIAGNOSIS — H18.612 STABLE KERATOCONUS OF LEFT EYE: ICD-10-CM

## 2025-04-02 DIAGNOSIS — Z94.7 POST CORNEAL TRANSPLANT: Primary | ICD-10-CM

## 2025-04-02 PROCEDURE — G0463 HOSPITAL OUTPT CLINIC VISIT: HCPCS | Performed by: OPHTHALMOLOGY

## 2025-04-02 PROCEDURE — 99024 POSTOP FOLLOW-UP VISIT: CPT | Mod: GC | Performed by: OPHTHALMOLOGY

## 2025-04-02 ASSESSMENT — VISUAL ACUITY
METHOD: SNELLEN - LINEAR
CORRECTION_TYPE: GLASSES
OD_CC: 20/70
OS_CC: 20/600

## 2025-04-02 ASSESSMENT — SLIT LAMP EXAM - LIDS
COMMENTS: NORMAL
COMMENTS: LID EDEMA

## 2025-04-02 ASSESSMENT — TONOMETRY
OS_IOP_MMHG: 08
IOP_METHOD: TONOPEN
OD_IOP_MMHG: 07

## 2025-04-02 ASSESSMENT — EXTERNAL EXAM - RIGHT EYE: OD_EXAM: NORMAL

## 2025-04-02 ASSESSMENT — EXTERNAL EXAM - LEFT EYE: OS_EXAM: NORMAL

## 2025-04-02 NOTE — PROGRESS NOTES
Chief complaint   Keratoconus OU  Corneal Ulcer OS    HPI   Levar Swan is a 49 year old male history of keratoconus and trisomy 21 who presented on 3/3/2025 with concern for not improving corneal ulcer in the left eye.  Patient was told to come to the [place] from [place] [facility].  Since Friday on 2/28/2025 patient has been having left eye pain and discharge.  Patient was evaluated to clinic at that time he was told he has a corneal ulcer that started on tobramycin (not fortified) and moxifloxacin every hour.  Patient says the ulcer happened all of a sudden and nothing hit it and nothing got into his eye.  He does not wear any contacts.  Nor does any swimming.  He currently lives with a group home and has a watcher.     Says that the ulcer has not gotten any better and is only gotten worse, it started all of a sudden.  When he followed up at [place] [facility] on 3/3/2025 he said it is only gotten worse even on the antibiotic regimen.    Interval hx 04/02/2025    Chief Complaint(s) and History of Present Illness(es)       Follow Up              Laterality: both eyes    Associated symptoms: Negative for eye pain, photophobia, flashes and itching    Treatments tried: eye drops and ointment    Response to treatment: mild improvement    Comments: Cornea follow up   Slight improvement   Prednisolone qid  Moxifloxacin bid  EES tid  Vaishnavi GUNTER 8:35 AM April 2, 2025                       Past ocular history   Prior eye surgery/laser/Trauma: None  CTL wearer:No  Glasses : Yes  Family Hx of eye disease: None    PMH     Past Medical History:   Diagnosis Date    Gastroesophageal reflux disease with esophagitis     Sleep apnea        PSH     Past Surgical History:   Procedure Laterality Date    KERATOPLASTY PENETRATING Left 03/07/2025    Procedure: LEFT PENETRATING KERATOPLASTY, INTRACAMERAL INJECTION OF ANTIBIOTIC;  Surgeon: Elvi Olvera MD;  Location: [facility] OR    [place] ANESTH,CORNEAL TRANSPLANT Left  03/07/2025       Meds     Current Outpatient Medications   Medication Sig Dispense Refill    erythromycin (ROMYCIN) 5 MG/GM ophthalmic ointment Place 0.5 inches Into the left eye 3 times daily. 3.5 g 11    moxifloxacin (VIGAMOX) 0.5 % ophthalmic solution Place 1 drop Into the left eye 2 times daily. 3 mL 3    Multiple Vitamins-Minerals (MULTIVITAMIN GUMMIES ADULT PO) Take by mouth.      neomycin-polymyxin-dexAMETHasone (MAXITROL) 3.5-54755-3.1 SUSP ophthalmic susp       omeprazole (PRILOSEC) 40 MG DR capsule Take 40 mg by mouth daily.      prednisoLONE acetate (PRED FORTE) 1 % ophthalmic suspension Place 1-2 drops Into the left eye 4 times daily. 15 mL 11    tobramycin (TOBREX) 15 mg/mL compounded ophthalmic solution Place 1 drop Into the left eye every hour. 10 mL 1    vancomycin (VANCOCIN) 25 mg/mL in hypromellose 0.3% cmpd ophthalmic solution Place 1 drop Into the left eye every hour. 10 mL 1    vitamin C (ASCORBIC ACID) 1000 MG TABS Take 1,000 mg by mouth daily.       No current facility-administered medications for this visit.       Labs   Corneal ulcer cultures 3/3/25: negative    Imaging   None    Drops Currently Taking   Moxifloxacin BID OS  Pred four times a day OS  Erythromycin ointment TID OS  Vitamin C 1000 mg QD    Assessment/Plan 04/02/2025   # Corneal ulcer, left eye  # Corneal perforation OS  # S/p Emergent PKP OS ([facility], 3/7/25)  - Onset 2/27/25  - Perforated ulcer s/p PKP 3/7/25  - Cornea with few D folds, epi defect 60%   - Cultures negative (3/7/25), though had been on topical antibiotic drops  - 04/02/25: inferior epithelial defect has healed. Recommend to continue topical antibiotics given the potential for oropharyngeal microbial exposure in the context of recent PKP < 1 month due to perforated corneal ulcer, but can stop moxifloxacin    Plan:   - Stop Moxifloxacin BID OS  - Pred four times a day OS  - Erythromycin ointment TID OS  - Vitamin C 1000 mg QD  - Follow up 1 month - with   May Da Silva MD  PGY-3, Ophthalmology  [facility]    Attending Physician Attestation:  Complete documentation of historical and exam elements from today's encounter can be found in the full encounter summary report (not reduplicated in this progress note).  I personally obtained the chief complaint(s) and history of present illness.  I confirmed and edited as necessary the review of systems, past medical/surgical history, family history, social history, and examination findings as documented by others; and I examined the patient myself.  I personally reviewed the relevant tests, images, and reports as documented above.  I formulated and edited as necessary the assessment and plan and discussed the findings and management plan with the patient and family. - Abdiaziz Briseno MD

## 2025-04-02 NOTE — NURSING NOTE
Chief Complaints and History of Present Illnesses   Patient presents with    Follow Up     Cornea follow up   Slight improvement   Prednisolone qid  Moxifloxacin bid  EES tid  Vaishnavi Dziuk [place] 8:35 AM April 2, 2025          Chief Complaint(s) and History of Present Illness(es)       Follow Up              Laterality: both eyes    Associated symptoms: Negative for eye pain, photophobia, flashes and itching    Treatments tried: eye drops and ointment    Response to treatment: mild improvement    Comments: Cornea follow up   Slight improvement   Prednisolone qid  Moxifloxacin bid  EES tid  Vaishnavi Dziuk [place] 8:35 AM April 2, 2025

## 2025-04-03 LAB
BACTERIA TISS BX CULT: NORMAL
BACTERIA TISS BX CULT: NORMAL

## 2025-04-05 LAB
BACTERIA TISS BX CULT: NO GROWTH
BACTERIA TISS BX CULT: NO GROWTH

## 2025-06-02 ENCOUNTER — OFFICE VISIT (OUTPATIENT)
Dept: OPHTHALMOLOGY | Facility: CLINIC | Age: 50
End: 2025-06-02
Attending: OPHTHALMOLOGY
Payer: MEDICARE

## 2025-06-02 DIAGNOSIS — Z94.7 PENETRATING KERATOPLASTY GRAFT IN PLACE: Primary | ICD-10-CM

## 2025-06-02 PROCEDURE — G0463 HOSPITAL OUTPT CLINIC VISIT: HCPCS | Performed by: OPHTHALMOLOGY

## 2025-06-02 RX ORDER — MOXIFLOXACIN 5 MG/ML
1 SOLUTION/ DROPS OPHTHALMIC 4 TIMES DAILY
Qty: 3 ML | Refills: 11 | Status: SHIPPED | OUTPATIENT
Start: 2025-06-02

## 2025-06-02 RX ORDER — PREDNISOLONE ACETATE 10 MG/ML
1-2 SUSPENSION/ DROPS OPHTHALMIC 4 TIMES DAILY
Qty: 10 ML | Refills: 11 | Status: SHIPPED | OUTPATIENT
Start: 2025-06-02

## 2025-06-02 RX ORDER — MINERAL OIL, PETROLATUM 425; 573 MG/G; MG/G
1 OINTMENT OPHTHALMIC 3 TIMES DAILY
Qty: 3.5 G | Refills: 11 | Status: SHIPPED | OUTPATIENT
Start: 2025-06-02

## 2025-06-02 ASSESSMENT — EXTERNAL EXAM - RIGHT EYE: OD_EXAM: NORMAL

## 2025-06-02 ASSESSMENT — TONOMETRY
OS_IOP_MMHG: 10
OD_IOP_MMHG: 8
IOP_METHOD: ICARE

## 2025-06-02 ASSESSMENT — EXTERNAL EXAM - LEFT EYE: OS_EXAM: NORMAL

## 2025-06-02 ASSESSMENT — VISUAL ACUITY
OD_CC+: -1
METHOD: SNELLEN - LINEAR
OS_CC: 20/400
CORRECTION_TYPE: GLASSES
OD_CC: 20/100

## 2025-06-02 ASSESSMENT — SLIT LAMP EXAM - LIDS
COMMENTS: NORMAL
COMMENTS: LID EDEMA

## 2025-06-02 NOTE — PROGRESS NOTES
Chief complaint   Keratoconus OU  Corneal Ulcer OS    HPI   Levar Swan is a 49 year old male history of keratoconus and trisomy 21 who presented on 3/3/2025 with concern for not improving corneal ulcer in the left eye.  Patient was told to come to the [place] from [place] [facility].  Since Friday on 2/28/2025 patient has been having left eye pain and discharge.  Patient was evaluated to clinic at that time he was told he has a corneal ulcer that started on tobramycin (not fortified) and moxifloxacin every hour.  Patient says the ulcer happened all of a sudden and nothing hit it and nothing got into his eye.  He does not wear any contacts.  Nor does any swimming.  He currently lives with a group home and has a watcher.     Says that the ulcer has not gotten any better and is only gotten worse, it started all of a sudden.  When he followed up at [place] [facility] on 3/3/2025 he said it is only gotten worse even on the antibiotic regimen.    Interval hx 06/02/2025  Chief Complaint(s) and History of Present Illness(es)       Dry Eye(s) Both Eyes    In both eyes.  Associated symptoms include red eyes and dryness.  Negative for eye pain, burning and itching.  Pain was noted as 0/10. Additional comments: Dry eye syndrome of both eyes              Comments    Pt states vision is better.  No pain.  No other concerns.  Prednisolone LE 4x/day  Erythro ing LE 3x/day  Moxi LE 2x/day    VIDA Murphy June 2, 2025 8:43 AM                             Past ocular history   Prior eye surgery/laser/Trauma:   PKP left eye 3/7/25  CTL wearer:No  Glasses : Yes  Family Hx of eye disease: None    PMH     Past Medical History:   Diagnosis Date    Gastroesophageal reflux disease with esophagitis     Sleep apnea        PSH     Past Surgical History:   Procedure Laterality Date    KERATOPLASTY PENETRATING Left 03/07/2025    Procedure: LEFT PENETRATING KERATOPLASTY, INTRACAMERAL INJECTION OF ANTIBIOTIC;  Surgeon: May  MD Elvi;  Location: [facility] OR    DC ANESTH,CORNEAL TRANSPLANT Left 03/07/2025       Meds     Current Outpatient Medications   Medication Sig Dispense Refill    erythromycin (ROMYCIN) 5 MG/GM ophthalmic ointment Place 0.5 inches Into the left eye 3 times daily. 3.5 g 11    moxifloxacin (VIGAMOX) 0.5 % ophthalmic solution Place 1 drop Into the left eye 2 times daily. 3 mL 11    moxifloxacin (VIGAMOX) 0.5 % ophthalmic solution Place 1 drop Into the left eye 2 times daily. 3 mL 3    Multiple Vitamins-Minerals (MULTIVITAMIN GUMMIES ADULT PO) Take by mouth.      neomycin-polymyxin-dexAMETHasone (MAXITROL) 3.5-08313-4.1 SUSP ophthalmic susp       omeprazole (PRILOSEC) 40 MG DR capsule Take 40 mg by mouth daily.      prednisoLONE acetate (PRED FORTE) 1 % ophthalmic suspension Place 1-2 drops Into the left eye 4 times daily. 10 mL 0    prednisoLONE acetate (PRED FORTE) 1 % ophthalmic suspension Place 1-2 drops Into the left eye 4 times daily. 15 mL 11    tobramycin (TOBREX) 15 mg/mL compounded ophthalmic solution Place 1 drop Into the left eye every hour. 10 mL 1    vancomycin (VANCOCIN) 25 mg/mL in hypromellose 0.3% cmpd ophthalmic solution Place 1 drop Into the left eye every hour. 10 mL 1    vitamin C (ASCORBIC ACID) 1000 MG TABS Take 1,000 mg by mouth daily.       No current facility-administered medications for this visit.       Labs   Corneal ulcer cultures 3/3/25: negative    Imaging   None    Drops Currently Taking  6/2/2025   Pred four times a day OS  Erythromycin ointment TID left eye  Moxifloxacin BID left eye   Vitamin C 1000 mg QD    Assessment/Plan 06/02/2025   # Corneal ulcer, left eye  # Corneal perforation OS  # [facility]  - Onset 2/27/25  - Perforated ulcer s/p PKP 3/7/25  - Cornea with few D folds, epi defect 60%   - Cultures negative (3/7/25), though had been on topical antibiotic drops  - 04/02/25: inferior epithelial defect has healed. Recommend to continue topical  antibiotics given the potential for oropharyngeal microbial exposure in the context of recent PKP < 1 month due to perforated corneal ulcer, but can stop moxifloxacin  5/2/25:POM2 s/p PKP left eye; clear graft; AC D/Q; 2 loose sutures  06/02/2025 2 loose sutures removed today, watch for inferior infiltrate      Plan 6/2/2025:   Increase Moxi QID   2 sutures removed  Inferior KNV and thinn cornea, to be monitored  Follow-up 2 weeks     Sen Naik MD  Resident Physician, PGY-3  Department of Ophthalmology     Attending Physician Attestation:  Complete documentation of historical and exam elements from today's encounter can be found in the full encounter summary report (not reduplicated in this progress note).  I personally obtained the chief complaint(s) and history of present illness.  I confirmed and edited as necessary the review of systems, past medical/surgical history, family history, social history, and examination findings as documented by others; and I examined the patient myself.  I personally reviewed the relevant tests, images, and reports as documented above.  I formulated and edited as necessary the assessment and plan and discussed the findings and management plan with the patient and family. - Elvi Olvera MD

## 2025-06-02 NOTE — NURSING NOTE
Chief Complaints and History of Present Illnesses   Patient presents with    Dry Eye(s) Both Eyes     Dry eye syndrome of both eyes      Chief Complaint(s) and History of Present Illness(es)       Dry Eye(s) Both Eyes              Laterality: both eyes    Associated symptoms: red eyes and dryness.  Negative for eye pain, burning and itching    Pain scale: 0/10    Comments: Dry eye syndrome of both eyes               Comments    Pt states vision is better.  No pain.  No other concerns.  Prednisolone LE 4x/day  Erythro ing LE 3x/day  Moxi LE 2x/day    VIDA Murphy June 2, 2025 8:43 AM

## 2025-06-08 ENCOUNTER — HEALTH MAINTENANCE LETTER (OUTPATIENT)
Age: 50
End: 2025-06-08

## 2025-06-18 ENCOUNTER — TELEPHONE (OUTPATIENT)
Dept: OPHTHALMOLOGY | Facility: CLINIC | Age: 50
End: 2025-06-18
Payer: MEDICARE

## 2025-06-18 ENCOUNTER — OFFICE VISIT (OUTPATIENT)
Dept: OPHTHALMOLOGY | Facility: CLINIC | Age: 50
End: 2025-06-18
Attending: OPHTHALMOLOGY
Payer: MEDICARE

## 2025-06-18 DIAGNOSIS — H16.122 FILAMENTARY KERATITIS OF LEFT EYE: Primary | ICD-10-CM

## 2025-06-18 DIAGNOSIS — H04.123 DRY EYE SYNDROME OF BOTH EYES: ICD-10-CM

## 2025-06-18 PROCEDURE — 68761 CLOSE TEAR DUCT OPENING: CPT | Performed by: OPHTHALMOLOGY

## 2025-06-18 PROCEDURE — G0463 HOSPITAL OUTPT CLINIC VISIT: HCPCS | Performed by: OPHTHALMOLOGY

## 2025-06-18 PROCEDURE — 65435 CURETTE/TREAT CORNEA: CPT | Performed by: OPHTHALMOLOGY

## 2025-06-18 PROCEDURE — 99213 OFFICE O/P EST LOW 20 MIN: CPT | Mod: GC | Performed by: OPHTHALMOLOGY

## 2025-06-18 RX ORDER — CYCLOSPORINE 0.5 MG/ML
1 EMULSION OPHTHALMIC 2 TIMES DAILY
Qty: 60 EACH | Refills: 11 | Status: SHIPPED | OUTPATIENT
Start: 2025-06-18

## 2025-06-18 ASSESSMENT — REFRACTION_WEARINGRX
SPECS_TYPE: SVL
OS_SPHERE: -19.00
OD_AXIS: 145
OD_SPHERE: -19.00
OD_CYLINDER: +2.00
OS_CYLINDER: +2.00
OS_AXIS: 035

## 2025-06-18 ASSESSMENT — TONOMETRY
OD_IOP_MMHG: 6
IOP_METHOD: ICARE
OS_IOP_MMHG: 9

## 2025-06-18 ASSESSMENT — VISUAL ACUITY
OD_PH_CC+: -1
OS_CC: 20/500
OD_PH_CC: 20/100
METHOD: SNELLEN - LINEAR
OD_CC: 20/125

## 2025-06-18 ASSESSMENT — SLIT LAMP EXAM - LIDS
COMMENTS: LID EDEMA
COMMENTS: NORMAL

## 2025-06-18 ASSESSMENT — EXTERNAL EXAM - LEFT EYE: OS_EXAM: NORMAL

## 2025-06-18 ASSESSMENT — EXTERNAL EXAM - RIGHT EYE: OD_EXAM: NORMAL

## 2025-06-18 NOTE — TELEPHONE ENCOUNTER
PRIOR AUTHORIZATION DENIED    Medication: CYCLOSPORINE 0.05 % OP EMUL  Insurance Company: [employer] - Phone [phone] Fax [phone]  Denial Date: 6/18/2025  Denial Reason(s): BRAND    Appeal Information:       Patient Notified: No

## 2025-06-18 NOTE — PROGRESS NOTES
Chief complaint   Keratoconus OU  Corneal Ulcer OS    HPI   Levar Swan is a 49 year old male history of keratoconus and trisomy 21 who presented on 3/3/2025 with concern for not improving corneal ulcer in the left eye.  Patient was told to come to the [place] from [place] [facility].  Since Friday on 2/28/2025 patient has been having left eye pain and discharge.  Patient was evaluated to clinic at that time he was told he has a corneal ulcer that started on tobramycin (not fortified) and moxifloxacin every hour.  Patient says the ulcer happened all of a sudden and nothing hit it and nothing got into his eye.  He does not wear any contacts.  Nor does any swimming.  He currently lives with a group home and has a watcher.     Says that the ulcer has not gotten any better and is only gotten worse, it started all of a sudden.  When he followed up at [place] [facility] on 3/3/2025 he said it is only gotten worse even on the antibiotic regimen.    Interval hx 06/18/2025  Chief Complaint(s) and History of Present Illness(es)       Post Op (Ophthalmology) Left Eye    In left eye.  Associated symptoms include Negative for eye pain.  Pain was noted as 0/10.             Comments    Levar is here to follow up on left corneal graft. He states no pain.    Donald Cifuentesd COT 8:57 AM June 18, 2025                    Past ocular history   Prior eye surgery/laser/Trauma:   PKP left eye 3/7/25  CTL wearer:No  Glasses : Yes  Family Hx of eye disease: None    PMH     Past Medical History:   Diagnosis Date    Gastroesophageal reflux disease with esophagitis     Sleep apnea        PSH     Past Surgical History:   Procedure Laterality Date    KERATOPLASTY PENETRATING Left 03/07/2025    Procedure: LEFT PENETRATING KERATOPLASTY, INTRACAMERAL INJECTION OF ANTIBIOTIC;  Surgeon: Elvi Olvera MD;  Location: [facility] OR    [place] ANESTH,CORNEAL TRANSPLANT Left 03/07/2025       Meds     Current Outpatient Medications   Medication Sig  Dispense Refill    erythromycin (ROMYCIN) 5 MG/GM ophthalmic ointment Place 0.5 inches Into the left eye 3 times daily. 3.5 g 11    moxifloxacin (VIGAMOX) 0.5 % ophthalmic solution Place 1 drop Into the left eye 4 times daily. 3 mL 11    moxifloxacin (VIGAMOX) 0.5 % ophthalmic solution Place 1 drop Into the left eye 2 times daily. 3 mL 11    moxifloxacin (VIGAMOX) 0.5 % ophthalmic solution Place 1 drop Into the left eye 2 times daily. 3 mL 3    Multiple Vitamins-Minerals (MULTIVITAMIN GUMMIES ADULT PO) Take by mouth.      neomycin-polymyxin-dexAMETHasone (MAXITROL) 3.5-88994-6.1 SUSP ophthalmic susp       omeprazole (PRILOSEC) 40 MG DR capsule Take 40 mg by mouth daily.      prednisoLONE acetate (PRED FORTE) 1 % ophthalmic suspension Place 1-2 drops Into the left eye 4 times daily. 10 mL 11    prednisoLONE acetate (PRED FORTE) 1 % ophthalmic suspension Place 1-2 drops Into the left eye 4 times daily. 10 mL 0    prednisoLONE acetate (PRED FORTE) 1 % ophthalmic suspension Place 1-2 drops Into the left eye 4 times daily. 15 mL 11    tobramycin (TOBREX) 15 mg/mL compounded ophthalmic solution Place 1 drop Into the left eye every hour. 10 mL 1    vancomycin (VANCOCIN) 25 mg/mL in hypromellose 0.3% cmpd ophthalmic solution Place 1 drop Into the left eye every hour. 10 mL 1    vitamin C (ASCORBIC ACID) 1000 MG TABS Take 1,000 mg by mouth daily.      White Petrolatum-Mineral Oil (REFRESH P.M.) OINT Apply 3.5 g to eye 3 times daily. 3.5 g 11     No current facility-administered medications for this visit.       Labs   Corneal ulcer cultures 3/3/25: negative    Imaging   None    Drops Currently Taking  6/2/2025   Pred four times a day OS  Erythromycin ointment TID left eye  Moxifloxacin BID left eye   Vitamin C 1000 mg QD    Assessment/Plan 06/18/2025   # Corneal ulcer, left eye  # Corneal perforation OS  # S/p Emergent PKP OS ([facility], 3/7/25)  - Onset 2/27/25  - Perforated ulcer s/p PKP 3/7/25  - Cornea with few D folds,  epi defect 60%   - Cultures negative (3/7/25), though had been on topical antibiotic drops  - 04/02/25: inferior epithelial defect has healed. Recommend to continue topical antibiotics given the potential for oropharyngeal microbial exposure in the context of recent PKP < 1 month due to perforated corneal ulcer, but can stop moxifloxacin  5/2/25:POM2 s/p PKP left eye; clear graft; AC D/Q; 2 loose sutures  6/18/25 central epi defect noted, uncontrolled dry eye    #Filamentous keratitis, central left eye  Removed in clinic with forceps    #dry eye syndrome left eye>oD  Worsening despite using AT    Plan :   Moxi BID  Start restasis 2 times daily  Removed corneal epithelium in clinic today  Inferior KNV and thinn cornea, to be monitored  Discussed R/B/A of punctal plugs,including over tearing  patient agree to proceed with upper and lower duct of left eye using silicone plugs   - Able to place LLL, unable to place YORDY 2/2 patient discomfort    Follow-up 2 weeks     Sen Naik MD  Resident Physician, PGY-3  Department of Ophthalmology     Attending Physician Attestation: Complete documentation of historical and exam elements from today's encounter can be found in the full encounter summary report (not reduplicated in this progress note). I personally obtained the chief complaint(s) and history of present illness. I confirmed and edited as necessary the review of systems, past medical/surgical history, family history, social history, and examination findings as documented by others; and I examined the patient myself. I personally reviewed the relevant tests, images, and reports as documented above. I formulated and edited as necessary the assessment and plan and discussed the findings and management plan with the patient and family.  I was present for the entire procedure(s). - Elvi Olvera M.D

## 2025-07-02 ENCOUNTER — OFFICE VISIT (OUTPATIENT)
Dept: OPHTHALMOLOGY | Facility: CLINIC | Age: 50
End: 2025-07-02
Attending: OPHTHALMOLOGY
Payer: MEDICARE

## 2025-07-02 DIAGNOSIS — H04.123 DRY EYE SYNDROME OF BOTH EYES: Primary | ICD-10-CM

## 2025-07-02 DIAGNOSIS — Z94.7 POST CORNEAL TRANSPLANT: ICD-10-CM

## 2025-07-02 DIAGNOSIS — H18.612 STABLE KERATOCONUS OF LEFT EYE: ICD-10-CM

## 2025-07-02 DIAGNOSIS — Z94.7 PENETRATING KERATOPLASTY GRAFT IN PLACE: ICD-10-CM

## 2025-07-02 PROCEDURE — 99213 OFFICE O/P EST LOW 20 MIN: CPT | Mod: GC | Performed by: OPHTHALMOLOGY

## 2025-07-02 PROCEDURE — G0463 HOSPITAL OUTPT CLINIC VISIT: HCPCS | Performed by: OPHTHALMOLOGY

## 2025-07-02 ASSESSMENT — REFRACTION_WEARINGRX
OS_SPHERE: -19.00
OD_CYLINDER: +2.00
OD_AXIS: 145
OS_CYLINDER: +2.00
OD_SPHERE: -19.00
OS_AXIS: 035
SPECS_TYPE: SVL

## 2025-07-02 ASSESSMENT — VISUAL ACUITY
OD_CC+: -1
OD_CC: 20/80
CORRECTION_TYPE: GLASSES
OS_CC: 20/400
OD_PH_CC: 20/70
OD_PH_CC+: -1
METHOD: SNELLEN - LINEAR
OS_PH_CC: 20/150

## 2025-07-02 ASSESSMENT — EXTERNAL EXAM - RIGHT EYE: OD_EXAM: NORMAL

## 2025-07-02 ASSESSMENT — SLIT LAMP EXAM - LIDS
COMMENTS: LID EDEMA
COMMENTS: NORMAL

## 2025-07-02 ASSESSMENT — TONOMETRY
OD_IOP_MMHG: 06
OS_IOP_MMHG: 08
IOP_METHOD: ICARE

## 2025-07-02 ASSESSMENT — EXTERNAL EXAM - LEFT EYE: OS_EXAM: NORMAL

## 2025-07-02 NOTE — PROGRESS NOTES
Chief complaint   Keratoconus OU  Corneal Ulcer OS    HPI   Levar Swan is a 49 year old male history of keratoconus and trisomy 21 who presented on 3/3/2025 with concern for not improving corneal ulcer in the left eye.  Patient was told to come to the [place] from [place] [facility].  Since Friday on 2/28/2025 patient has been having left eye pain and discharge.  Patient was evaluated to clinic at that time he was told he has a corneal ulcer that started on tobramycin (not fortified) and moxifloxacin every hour.  Patient says the ulcer happened all of a sudden and nothing hit it and nothing got into his eye.  He does not wear any contacts.  Nor does any swimming.  He currently lives with a group home and has a watcher.     Says that the ulcer has not gotten any better and is only gotten worse, it started all of a sudden.  When he followed up at [place] [facility] on 3/3/2025 he said it is only gotten worse even on the antibiotic regimen.    Interval hx 07/02/2025    Chief Complaint(s) and History of Present Illness(es)       Keratitis Follow Up              Laterality: left eye    Associated symptoms: Negative for eye pain, burning and itching    Treatments tried: artificial tears    Pain scale: 1/10    Comments: Filamentary keratitis of left eye    Restasis BID OU              Comments    Patient reports no pain or irritation. No vision changes since last visit.    Mariposa Pinedo 8:36 AM 07/02/2025                     Past ocular history   Prior eye surgery/laser/Trauma:   PKP left eye 3/7/25  CTL wearer:No  Glasses : Yes  Family Hx of eye disease: None    PMH     Past Medical History:   Diagnosis Date    Gastroesophageal reflux disease with esophagitis     Sleep apnea        PSH     Past Surgical History:   Procedure Laterality Date    KERATOPLASTY PENETRATING Left 03/07/2025    Procedure: LEFT PENETRATING KERATOPLASTY, INTRACAMERAL INJECTION OF ANTIBIOTIC;  Surgeon: Elvi Olvera MD;  Location:   OR    [place] ANESTH,CORNEAL TRANSPLANT Left 03/07/2025       Meds     Current Outpatient Medications   Medication Sig Dispense Refill    cycloSPORINE (RESTASIS) 0.05 % ophthalmic emulsion Place 1 drop Into the left eye 2 times daily. 60 each 11    erythromycin (ROMYCIN) 5 MG/GM ophthalmic ointment Place 0.5 inches Into the left eye 3 times daily. 3.5 g 11    moxifloxacin (VIGAMOX) 0.5 % ophthalmic solution Place 1 drop Into the left eye 4 times daily. 3 mL 11    moxifloxacin (VIGAMOX) 0.5 % ophthalmic solution Place 1 drop Into the left eye 2 times daily. 3 mL 11    moxifloxacin (VIGAMOX) 0.5 % ophthalmic solution Place 1 drop Into the left eye 2 times daily. 3 mL 3    Multiple Vitamins-Minerals (MULTIVITAMIN GUMMIES ADULT PO) Take by mouth.      neomycin-polymyxin-dexAMETHasone (MAXITROL) 3.5-79239-4.1 SUSP ophthalmic susp       omeprazole (PRILOSEC) 40 MG DR capsule Take 40 mg by mouth daily.      prednisoLONE acetate (PRED FORTE) 1 % ophthalmic suspension Place 1-2 drops Into the left eye 4 times daily. 10 mL 11    prednisoLONE acetate (PRED FORTE) 1 % ophthalmic suspension Place 1-2 drops Into the left eye 4 times daily. 10 mL 0    prednisoLONE acetate (PRED FORTE) 1 % ophthalmic suspension Place 1-2 drops Into the left eye 4 times daily. 15 mL 11    tobramycin (TOBREX) 15 mg/mL compounded ophthalmic solution Place 1 drop Into the left eye every hour. 10 mL 1    vancomycin (VANCOCIN) 25 mg/mL in hypromellose 0.3% cmpd ophthalmic solution Place 1 drop Into the left eye every hour. 10 mL 1    vitamin C (ASCORBIC ACID) 1000 MG TABS Take 1,000 mg by mouth daily.      White Petrolatum-Mineral Oil (REFRESH P.M.) OINT Apply 3.5 g to eye 3 times daily. 3.5 g 11     No current facility-administered medications for this visit.       Labs   Corneal ulcer cultures 3/3/25: negative    Imaging   None    Drops Currently Taking  07/02/25   Restasis BID OS  Pred Forte times a day OS  Erythromycin ointment TID OS  Moxifloxacin BID  left eye   Vitamin C 1000 mg QD    Assessment/Plan 07/02/2025   # K ulcer, left eye  # Corneal perforation OS  # S/p Emergent PKP OS ([facility], 3/7/25)  - Onset 2/27/25  - Perforated ulcer s/p PKP 3/7/25  - Cornea with few D folds, epi defect 60%   - Cultures negative (3/7/25), though had been on topical antibiotic drops  - 04/02/25: inferior epithelial defect has healed. Recommend to continue topical antibiotics given the potential for oropharyngeal microbial exposure in the context of recent PKP < 1 month due to perforated corneal ulcer, but can stop moxifloxacin  - 5/2/25:POM2 s/p PKP left eye; clear graft; AC D/Q; 2 loose sutures  - 6/18/25 central epi defect noted, uncontrolled dry eye, punctal plugs placed  - 07/02/25: inferior epi defect noted <1mm today, thinning does not appear to have progressed,1 loose suture superior    #Filamentous keratitis, central left eye  Removed in clinic with forceps    #dry eye syndrome left eye>oD  Worsening despite using AT    Plan :   Suture removed today  Restasis BID OS  Refresh artificial tears QID OS  Pred Forte times a day OS  Erythromycin ointment TID OS  Moxifloxacin BID left eye   Vitamin C 1000 mg QD    Follow-up 3 weeks     Norman Da Silva MD  PGY-3, [facility]  [facility]  Attending Physician Attestation:  Complete documentation of historical and exam elements from today's encounter can be found in the full encounter summary report (not reduplicated in this progress note).  I personally obtained the chief complaint(s) and history of present illness.  I confirmed and edited as necessary the review of systems, past medical/surgical history, family history, social history, and examination findings as documented by others; and I examined the patient myself.  I personally reviewed the relevant tests, images, and reports as documented above.  I formulated and edited as necessary the assessment and plan and discussed the findings and management plan with  the patient and family. - Elvi Olvera MD

## 2025-07-02 NOTE — PATIENT INSTRUCTIONS
Restasis twice daily in the left eye   Refresh artificial tears four times per day in the left eye  Pred Forte times a day in the left eye  Erythromycin ointment three times per day OS  Moxifloxacin twice per day left eye   Vitamin C 1000 mg daily

## 2025-07-23 ENCOUNTER — OFFICE VISIT (OUTPATIENT)
Dept: OPHTHALMOLOGY | Facility: CLINIC | Age: 50
End: 2025-07-23
Attending: OPHTHALMOLOGY
Payer: MEDICARE

## 2025-07-23 DIAGNOSIS — H16.122 FILAMENTARY KERATITIS OF LEFT EYE: ICD-10-CM

## 2025-07-23 DIAGNOSIS — H04.123 DRY EYE SYNDROME OF BOTH EYES: Primary | ICD-10-CM

## 2025-07-23 DIAGNOSIS — Z94.7 PENETRATING KERATOPLASTY GRAFT IN PLACE: ICD-10-CM

## 2025-07-23 PROCEDURE — G2211 COMPLEX E/M VISIT ADD ON: HCPCS | Performed by: OPHTHALMOLOGY

## 2025-07-23 PROCEDURE — G0463 HOSPITAL OUTPT CLINIC VISIT: HCPCS | Performed by: OPHTHALMOLOGY

## 2025-07-23 PROCEDURE — 99213 OFFICE O/P EST LOW 20 MIN: CPT | Performed by: OPHTHALMOLOGY

## 2025-07-23 ASSESSMENT — TONOMETRY
OS_IOP_MMHG: 07
OD_IOP_MMHG: 08
IOP_METHOD: ICARE

## 2025-07-23 ASSESSMENT — VISUAL ACUITY
OS_PH_CC: 20/300
OD_PH_CC: 20/80
OD_CC: 20/100
METHOD: SNELLEN - LINEAR
OD_PH_CC+: -1
OS_CC: 20/400

## 2025-07-23 ASSESSMENT — SLIT LAMP EXAM - LIDS
COMMENTS: LID EDEMA
COMMENTS: NORMAL

## 2025-07-23 ASSESSMENT — EXTERNAL EXAM - RIGHT EYE: OD_EXAM: NORMAL

## 2025-07-23 ASSESSMENT — EXTERNAL EXAM - LEFT EYE: OS_EXAM: NORMAL

## 2025-07-23 NOTE — NURSING NOTE
Chief Complaints and History of Present Illnesses   Patient presents with    Follow Up     Dry eye syndrome of both eyes   K ulcer, left eye  # Corneal perforation OS  # S/p Emergent PKP OS ([facility], 3/7/25)       Chief Complaint(s) and History of Present Illness(es)       Follow Up              Comments: Dry eye syndrome of both eyes   K ulcer, left eye  # Corneal perforation OS  # S/p Emergent PKP OS ([facility], 3/7/25)                Comments    No vision changes   No eye pain   Using:   Restasis BID OS  Refresh artificial tears QID OS  Pred Forte times a day OS  Erythromycin ointment TID OS  Moxifloxacin BID left eye     Irma Blake COT 9:09 AM July 23, 2025

## 2025-07-23 NOTE — PROGRESS NOTES
Chief complaint   Keratoconus OU  Corneal Ulcer OS    HPI   Levar Swan is a 49 year old male history of keratoconus and trisomy 21 who presented on 3/3/2025 with concern for not improving corneal ulcer in the left eye.  Patient was told to come to the [place] from [facility].  Since Friday on 2/28/2025 patient has been having left eye pain and discharge.  Patient was evaluated to clinic at that time he was told he has a corneal ulcer that started on tobramycin (not fortified) and moxifloxacin every hour.  Patient says the ulcer happened all of a sudden and nothing hit it and nothing got into his eye.  He does not wear any contacts.  Nor does any swimming.  He currently lives with a group home and has a watcher.     Says that the ulcer has not gotten any better and is only gotten worse, it started all of a sudden.  When he followed up at [facility] on 3/3/2025 he said it is only gotten worse even on the antibiotic regimen.  Interval hx 07/23/2025  Chief Complaint(s) and History of Present Illness(es)       Follow Up     Additional comments: Dry eye syndrome of both eyes   K ulcer, left eye  # Corneal perforation OS  # S/p Emergent PKP OS (May, 3/7/25)               Comments    No vision changes   No eye pain   Using:   Restasis BID OS  Refresh artificial tears QID OS  Pred Forte times a day OS  Erythromycin ointment TID OS  Moxifloxacin BID left eye     Irma Blake COT 9:09 AM July 23, 2025                              Past ocular history   Prior eye surgery/laser/Trauma:   PKP left eye 3/7/25  CTL wearer:No  Glasses : Yes  Family Hx of eye disease: None    PMH     Past Medical History:   Diagnosis Date    Gastroesophageal reflux disease with esophagitis     Sleep apnea        PSH     Past Surgical History:   Procedure Laterality Date    KERATOPLASTY PENETRATING Left 03/07/2025    Procedure: LEFT PENETRATING KERATOPLASTY, INTRACAMERAL INJECTION OF ANTIBIOTIC;  Surgeon: May  MD Elvi;  Location: [facility] OR    AZ ANESTH,CORNEAL TRANSPLANT Left 03/07/2025       Meds     Current Outpatient Medications   Medication Sig Dispense Refill    cycloSPORINE (RESTASIS) 0.05 % ophthalmic emulsion Place 1 drop Into the left eye 2 times daily. 60 each 11    erythromycin (ROMYCIN) 5 MG/GM ophthalmic ointment Place 0.5 inches Into the left eye 3 times daily. 3.5 g 11    moxifloxacin (VIGAMOX) 0.5 % ophthalmic solution Place 1 drop Into the left eye 4 times daily. 3 mL 11    moxifloxacin (VIGAMOX) 0.5 % ophthalmic solution Place 1 drop Into the left eye 2 times daily. 3 mL 11    moxifloxacin (VIGAMOX) 0.5 % ophthalmic solution Place 1 drop Into the left eye 2 times daily. 3 mL 3    Multiple Vitamins-Minerals (MULTIVITAMIN GUMMIES ADULT PO) Take by mouth.      neomycin-polymyxin-dexAMETHasone (MAXITROL) 3.5-57518-9.1 SUSP ophthalmic susp       omeprazole (PRILOSEC) 40 MG DR capsule Take 40 mg by mouth daily.      prednisoLONE acetate (PRED FORTE) 1 % ophthalmic suspension Place 1-2 drops Into the left eye 4 times daily. 10 mL 11    prednisoLONE acetate (PRED FORTE) 1 % ophthalmic suspension Place 1-2 drops Into the left eye 4 times daily. 10 mL 0    prednisoLONE acetate (PRED FORTE) 1 % ophthalmic suspension Place 1-2 drops Into the left eye 4 times daily. 15 mL 11    tobramycin (TOBREX) 15 mg/mL compounded ophthalmic solution Place 1 drop Into the left eye every hour. 10 mL 1    vancomycin (VANCOCIN) 25 mg/mL in hypromellose 0.3% cmpd ophthalmic solution Place 1 drop Into the left eye every hour. 10 mL 1    vitamin C (ASCORBIC ACID) 1000 MG TABS Take 1,000 mg by mouth daily.      White Petrolatum-Mineral Oil (REFRESH P.M.) OINT Apply 3.5 g to eye 3 times daily. 3.5 g 11     No current facility-administered medications for this visit.       Labs   Corneal ulcer cultures 3/3/25: negative    Imaging   None    Drops Currently Taking  07/02/25   Restasis BID OS  Pred Forte times a day OS  Erythromycin ointment  TID OS  Moxifloxacin BID left eye   Vitamin C 1000 mg QD    Assessment/Plan 07/23/2025   # K ulcer, left eye  # Corneal perforation OS  # S/p Emergent PKP OS (May, 3/7/25)  - Onset 2/27/25  - Perforated ulcer s/p PKP 3/7/25  - Cornea with few D folds, epi defect 60%   - Cultures negative (3/7/25), though had been on topical antibiotic drops  - 04/02/25: inferior epithelial defect has healed. Recommend to continue topical antibiotics given the potential for oropharyngeal microbial exposure in the context of recent PKP < 1 month due to perforated corneal ulcer, but can stop moxifloxacin  - 5/2/25:POM2 s/p PKP left eye; clear graft; AC D/Q; 2 loose sutures  - 6/18/25 central epi defect noted, uncontrolled dry eye, punctal plugs placed  - 07/02/25: inferior epi defect noted <1mm today, thinning does not appear to have progressed,1 loose suture superior  7/23/25 no epi defect today, 1 loose suture removed    #Filamentous keratitis, central left eye  Removed in clinic with forceps previously    #dry eye syndrome left eye>oD  Worsening despite using AT    Plan :   Suture removed today  Restasis BID OS  Refresh artificial tears QID OS  Pred Forte 3 times a day OS  Erythromycin ointment TID OS  Moxifloxacin BID left eye   Vitamin C 1000 mg QD    Follow-up 1 month  Attending Physician Attestation:  Complete documentation of historical and exam elements from today's encounter can be found in the full encounter summary report (not reduplicated in this progress note).  I personally obtained the chief complaint(s) and history of present illness.  I confirmed and edited as necessary the review of systems, past medical/surgical history, family history, social history, and examination findings as documented by others; and I examined the patient myself.  I personally reviewed the relevant tests, images, and reports as documented above.  I formulated and edited as necessary the assessment and plan and discussed the findings and  management plan with the patient and family. - Elvi Olvera MD  The longitudinal plan of care for the diagnosis(es)/condition(s) as documented were addressed during this visit. Due to the added complexity in care, I will continue to support Levar Swan   in the subsequent management and with the ongoing continuity of care

## 2025-08-05 ENCOUNTER — TELEPHONE (OUTPATIENT)
Dept: OPHTHALMOLOGY | Facility: CLINIC | Age: 50
End: 2025-08-05
Payer: MEDICARE

## 2025-08-20 ENCOUNTER — OFFICE VISIT (OUTPATIENT)
Dept: OPHTHALMOLOGY | Facility: CLINIC | Age: 50
End: 2025-08-20
Attending: OPHTHALMOLOGY
Payer: MEDICARE

## 2025-08-20 DIAGNOSIS — H16.122 FILAMENTARY KERATITIS OF LEFT EYE: ICD-10-CM

## 2025-08-20 DIAGNOSIS — Z94.7 PENETRATING KERATOPLASTY GRAFT IN PLACE: ICD-10-CM

## 2025-08-20 DIAGNOSIS — H04.123 DRY EYE SYNDROME OF BOTH EYES: Primary | ICD-10-CM

## 2025-08-20 PROCEDURE — G0463 HOSPITAL OUTPT CLINIC VISIT: HCPCS | Performed by: OPHTHALMOLOGY

## 2025-08-20 PROCEDURE — 99213 OFFICE O/P EST LOW 20 MIN: CPT | Performed by: OPHTHALMOLOGY

## 2025-08-20 PROCEDURE — G2211 COMPLEX E/M VISIT ADD ON: HCPCS | Performed by: OPHTHALMOLOGY

## 2025-08-20 ASSESSMENT — TONOMETRY
OS_IOP_MMHG: 3
OD_IOP_MMHG: 3
IOP_METHOD: ICARE

## 2025-08-20 ASSESSMENT — VISUAL ACUITY
OD_CC: 20/100
OS_PH_CC: 20/300
CORRECTION_TYPE: GLASSES
OS_CC: 20/500
METHOD: SNELLEN - LINEAR

## 2025-08-20 ASSESSMENT — SLIT LAMP EXAM - LIDS
COMMENTS: NORMAL
COMMENTS: LID EDEMA

## 2025-08-20 ASSESSMENT — EXTERNAL EXAM - RIGHT EYE: OD_EXAM: NORMAL

## 2025-08-20 ASSESSMENT — EXTERNAL EXAM - LEFT EYE: OS_EXAM: NORMAL

## (undated) DEVICE — LINEN TOWEL PACK X5 5464

## (undated) DEVICE — EYE BLADE VACUUM RADIAL TREPHINE BARRON 7.5MM K20-2056

## (undated) DEVICE — EYE SHIELD PLASTIC CLEAR UNIVERSAL K9-6050

## (undated) DEVICE — PACK CATARACT UMMC

## (undated) DEVICE — EYE CANN IRR 30GA  ANTERIOR CHAMBER 581273

## (undated) DEVICE — EYE MARKING PAD 581057

## (undated) DEVICE — SOL NACL 0.9% 10ML VIAL 0409-4888-02

## (undated) DEVICE — SU ETHILON 10-0 CS160-6 12" 9000G

## (undated) DEVICE — SYR 01ML LL W/O NDL LATEX FREE 309628

## (undated) DEVICE — EYE PUNCH VACUUM BARRON 7.75MM K20-2107

## (undated) DEVICE — TAPE DURAPORE 3" SILK 1538-3

## (undated) DEVICE — BLADE KNIFE BEAVER MICROSHARP GREEN 377515

## (undated) DEVICE — SPONGE SPEAR WECK CEL 6/PKG 0008680

## (undated) DEVICE — TAPE TRANSPORE 1"X1.5YD 1534S-1

## (undated) DEVICE — EYE STRIP FLUORESCEIN TEST 1MG BIO-GLO HUO900-11

## (undated) DEVICE — EYE CANN IRR 27GA ANTERIOR CHAMBER 581280

## (undated) DEVICE — STRAP KNEE/BODY 31143004

## (undated) DEVICE — POSITIONER ARMBOARD FOAM 1PAIR LF FP-ARMB1

## (undated) DEVICE — SPECIMEN CONTAINER 5OZ STERILE 2600SA

## (undated) DEVICE — APPLICATORS COTTON TIP 6"X2 STERILE LF C15053-006

## (undated) DEVICE — SYR 03ML LL W/O NDL 309657

## (undated) DEVICE — DRSG TEGADERM 4X4 3/4" 1626W

## (undated) DEVICE — GLOVE BIOGEL PI MICRO SZ 7.5 48575

## (undated) DEVICE — NDL 27GA 1 1/2" 1188827112

## (undated) RX ORDER — FENTANYL CITRATE 50 UG/ML
INJECTION, SOLUTION INTRAMUSCULAR; INTRAVENOUS
Status: DISPENSED
Start: 2025-03-07

## (undated) RX ORDER — FENTANYL CITRATE-0.9 % NACL/PF 10 MCG/ML
PLASTIC BAG, INJECTION (ML) INTRAVENOUS
Status: DISPENSED
Start: 2025-03-07

## (undated) RX ORDER — KETOROLAC TROMETHAMINE 30 MG/ML
INJECTION, SOLUTION INTRAMUSCULAR; INTRAVENOUS
Status: DISPENSED
Start: 2025-03-07

## (undated) RX ORDER — PROPOFOL 10 MG/ML
INJECTION, EMULSION INTRAVENOUS
Status: DISPENSED
Start: 2025-03-07

## (undated) RX ORDER — BALANCED SALT SOLUTION 6.4; .75; .48; .3; 3.9; 1.7 MG/ML; MG/ML; MG/ML; MG/ML; MG/ML; MG/ML
SOLUTION OPHTHALMIC
Status: DISPENSED
Start: 2025-03-07

## (undated) RX ORDER — SODIUM CHLORIDE, SODIUM LACTATE, POTASSIUM CHLORIDE, CALCIUM CHLORIDE 600; 310; 30; 20 MG/100ML; MG/100ML; MG/100ML; MG/100ML
INJECTION, SOLUTION INTRAVENOUS
Status: DISPENSED
Start: 2025-03-07

## (undated) RX ORDER — ONDANSETRON 2 MG/ML
INJECTION INTRAMUSCULAR; INTRAVENOUS
Status: DISPENSED
Start: 2025-03-07

## (undated) RX ORDER — DEXAMETHASONE SODIUM PHOSPHATE 4 MG/ML
INJECTION, SOLUTION INTRA-ARTICULAR; INTRALESIONAL; INTRAMUSCULAR; INTRAVENOUS; SOFT TISSUE
Status: DISPENSED
Start: 2025-03-07